# Patient Record
Sex: MALE | Race: OTHER | NOT HISPANIC OR LATINO | URBAN - METROPOLITAN AREA
[De-identification: names, ages, dates, MRNs, and addresses within clinical notes are randomized per-mention and may not be internally consistent; named-entity substitution may affect disease eponyms.]

---

## 2021-02-11 ENCOUNTER — INPATIENT (INPATIENT)
Facility: HOSPITAL | Age: 51
LOS: 4 days | Discharge: HOME | End: 2021-02-16
Attending: STUDENT IN AN ORGANIZED HEALTH CARE EDUCATION/TRAINING PROGRAM | Admitting: STUDENT IN AN ORGANIZED HEALTH CARE EDUCATION/TRAINING PROGRAM
Payer: MEDICAID

## 2021-02-11 VITALS
TEMPERATURE: 98 F | SYSTOLIC BLOOD PRESSURE: 136 MMHG | OXYGEN SATURATION: 100 % | DIASTOLIC BLOOD PRESSURE: 89 MMHG | HEART RATE: 108 BPM | RESPIRATION RATE: 18 BRPM

## 2021-02-11 DIAGNOSIS — E11.10 TYPE 2 DIABETES MELLITUS WITH KETOACIDOSIS WITHOUT COMA: ICD-10-CM

## 2021-02-11 DIAGNOSIS — Z98.890 OTHER SPECIFIED POSTPROCEDURAL STATES: Chronic | ICD-10-CM

## 2021-02-11 LAB
ALBUMIN SERPL ELPH-MCNC: 4.9 G/DL — SIGNIFICANT CHANGE UP (ref 3.3–5)
ALP SERPL-CCNC: 103 U/L — SIGNIFICANT CHANGE UP (ref 40–120)
ALT FLD-CCNC: 64 U/L — HIGH (ref 4–41)
ANION GAP SERPL CALC-SCNC: 37 MMOL/L — HIGH (ref 7–14)
AST SERPL-CCNC: 32 U/L — SIGNIFICANT CHANGE UP (ref 4–40)
B-OH-BUTYR SERPL-SCNC: 10.1 MMOL/L — HIGH (ref 0–0.4)
BASOPHILS # BLD AUTO: 0.03 K/UL — SIGNIFICANT CHANGE UP (ref 0–0.2)
BASOPHILS NFR BLD AUTO: 0.2 % — SIGNIFICANT CHANGE UP (ref 0–2)
BILIRUB SERPL-MCNC: 0.5 MG/DL — SIGNIFICANT CHANGE UP (ref 0.2–1.2)
BLOOD GAS VENOUS COMPREHENSIVE RESULT: SIGNIFICANT CHANGE UP
BLOOD GAS VENOUS COMPREHENSIVE RESULT: SIGNIFICANT CHANGE UP
BUN SERPL-MCNC: 30 MG/DL — HIGH (ref 7–23)
CALCIUM SERPL-MCNC: 10.4 MG/DL — SIGNIFICANT CHANGE UP (ref 8.4–10.5)
CHLORIDE SERPL-SCNC: 87 MMOL/L — LOW (ref 98–107)
CO2 SERPL-SCNC: 7 MMOL/L — CRITICAL LOW (ref 22–31)
CREAT SERPL-MCNC: 1.53 MG/DL — HIGH (ref 0.5–1.3)
EOSINOPHIL # BLD AUTO: 0 K/UL — SIGNIFICANT CHANGE UP (ref 0–0.5)
EOSINOPHIL NFR BLD AUTO: 0 % — SIGNIFICANT CHANGE UP (ref 0–6)
GLUCOSE SERPL-MCNC: 695 MG/DL — CRITICAL HIGH (ref 70–99)
HCT VFR BLD CALC: 51 % — HIGH (ref 39–50)
HGB BLD-MCNC: 16.4 G/DL — SIGNIFICANT CHANGE UP (ref 13–17)
IANC: 15.25 K/UL — HIGH (ref 1.5–8.5)
IMM GRANULOCYTES NFR BLD AUTO: 0.9 % — SIGNIFICANT CHANGE UP (ref 0–1.5)
LIDOCAIN IGE QN: 53 U/L — SIGNIFICANT CHANGE UP (ref 7–60)
LYMPHOCYTES # BLD AUTO: 0.58 K/UL — LOW (ref 1–3.3)
LYMPHOCYTES # BLD AUTO: 3.4 % — LOW (ref 13–44)
MAGNESIUM SERPL-MCNC: 2.5 MG/DL — SIGNIFICANT CHANGE UP (ref 1.6–2.6)
MCHC RBC-ENTMCNC: 27.6 PG — SIGNIFICANT CHANGE UP (ref 27–34)
MCHC RBC-ENTMCNC: 32.2 GM/DL — SIGNIFICANT CHANGE UP (ref 32–36)
MCV RBC AUTO: 85.9 FL — SIGNIFICANT CHANGE UP (ref 80–100)
MONOCYTES # BLD AUTO: 1.17 K/UL — HIGH (ref 0–0.9)
MONOCYTES NFR BLD AUTO: 6.8 % — SIGNIFICANT CHANGE UP (ref 2–14)
NEUTROPHILS # BLD AUTO: 15.25 K/UL — HIGH (ref 1.8–7.4)
NEUTROPHILS NFR BLD AUTO: 88.7 % — HIGH (ref 43–77)
NRBC # BLD: 0 /100 WBCS — SIGNIFICANT CHANGE UP
NRBC # FLD: 0 K/UL — SIGNIFICANT CHANGE UP
OSMOLALITY SERPL: 347 MOSM/KG — HIGH (ref 275–295)
PHOSPHATE SERPL-MCNC: 6.4 MG/DL — HIGH (ref 2.5–4.5)
PLATELET # BLD AUTO: 189 K/UL — SIGNIFICANT CHANGE UP (ref 150–400)
POTASSIUM SERPL-MCNC: 4.7 MMOL/L — SIGNIFICANT CHANGE UP (ref 3.5–5.3)
POTASSIUM SERPL-SCNC: 4.7 MMOL/L — SIGNIFICANT CHANGE UP (ref 3.5–5.3)
PROT SERPL-MCNC: 8 G/DL — SIGNIFICANT CHANGE UP (ref 6–8.3)
RBC # BLD: 5.94 M/UL — HIGH (ref 4.2–5.8)
RBC # FLD: 13 % — SIGNIFICANT CHANGE UP (ref 10.3–14.5)
SARS-COV-2 RNA SPEC QL NAA+PROBE: SIGNIFICANT CHANGE UP
SODIUM SERPL-SCNC: 131 MMOL/L — LOW (ref 135–145)
WBC # BLD: 17.18 K/UL — HIGH (ref 3.8–10.5)
WBC # FLD AUTO: 17.18 K/UL — HIGH (ref 3.8–10.5)

## 2021-02-11 PROCEDURE — 74177 CT ABD & PELVIS W/CONTRAST: CPT | Mod: 26

## 2021-02-11 PROCEDURE — 99285 EMERGENCY DEPT VISIT HI MDM: CPT

## 2021-02-11 PROCEDURE — 71045 X-RAY EXAM CHEST 1 VIEW: CPT | Mod: 26

## 2021-02-11 PROCEDURE — 99291 CRITICAL CARE FIRST HOUR: CPT

## 2021-02-11 RX ORDER — SODIUM CHLORIDE 9 MG/ML
1000 INJECTION, SOLUTION INTRAVENOUS
Refills: 0 | Status: DISCONTINUED | OUTPATIENT
Start: 2021-02-11 | End: 2021-02-12

## 2021-02-11 RX ORDER — SENNA PLUS 8.6 MG/1
0 TABLET ORAL
Qty: 0 | Refills: 0 | DISCHARGE

## 2021-02-11 RX ORDER — CALCIUM CARBONATE 500(1250)
1 TABLET ORAL
Qty: 0 | Refills: 0 | DISCHARGE

## 2021-02-11 RX ORDER — SODIUM CHLORIDE 9 MG/ML
1000 INJECTION, SOLUTION INTRAVENOUS ONCE
Refills: 0 | Status: COMPLETED | OUTPATIENT
Start: 2021-02-11 | End: 2021-02-11

## 2021-02-11 RX ORDER — ONDANSETRON 8 MG/1
4 TABLET, FILM COATED ORAL ONCE
Refills: 0 | Status: COMPLETED | OUTPATIENT
Start: 2021-02-11 | End: 2021-02-11

## 2021-02-11 RX ORDER — SODIUM CHLORIDE 9 MG/ML
1000 INJECTION INTRAMUSCULAR; INTRAVENOUS; SUBCUTANEOUS ONCE
Refills: 0 | Status: COMPLETED | OUTPATIENT
Start: 2021-02-11 | End: 2021-02-11

## 2021-02-11 RX ORDER — MORPHINE SULFATE 50 MG/1
4 CAPSULE, EXTENDED RELEASE ORAL ONCE
Refills: 0 | Status: DISCONTINUED | OUTPATIENT
Start: 2021-02-11 | End: 2021-02-11

## 2021-02-11 RX ORDER — INFLUENZA VIRUS VACCINE 15; 15; 15; 15 UG/.5ML; UG/.5ML; UG/.5ML; UG/.5ML
0.5 SUSPENSION INTRAMUSCULAR ONCE
Refills: 0 | Status: DISCONTINUED | OUTPATIENT
Start: 2021-02-11 | End: 2021-02-16

## 2021-02-11 RX ORDER — CHLORHEXIDINE GLUCONATE 213 G/1000ML
1 SOLUTION TOPICAL
Refills: 0 | Status: DISCONTINUED | OUTPATIENT
Start: 2021-02-11 | End: 2021-02-13

## 2021-02-11 RX ORDER — SODIUM CHLORIDE 9 MG/ML
1000 INJECTION INTRAMUSCULAR; INTRAVENOUS; SUBCUTANEOUS
Refills: 0 | Status: DISCONTINUED | OUTPATIENT
Start: 2021-02-11 | End: 2021-02-11

## 2021-02-11 RX ORDER — SENNA PLUS 8.6 MG/1
2 TABLET ORAL AT BEDTIME
Refills: 0 | Status: DISCONTINUED | OUTPATIENT
Start: 2021-02-11 | End: 2021-02-16

## 2021-02-11 RX ORDER — CALCIUM CARBONATE 500(1250)
1 TABLET ORAL THREE TIMES A DAY
Refills: 0 | Status: DISCONTINUED | OUTPATIENT
Start: 2021-02-11 | End: 2021-02-16

## 2021-02-11 RX ORDER — INSULIN HUMAN 100 [IU]/ML
6.4 INJECTION, SOLUTION SUBCUTANEOUS
Qty: 100 | Refills: 0 | Status: DISCONTINUED | OUTPATIENT
Start: 2021-02-11 | End: 2021-02-13

## 2021-02-11 RX ADMIN — INSULIN HUMAN 6.4 UNIT(S)/HR: 100 INJECTION, SOLUTION SUBCUTANEOUS at 18:09

## 2021-02-11 RX ADMIN — ONDANSETRON 4 MILLIGRAM(S): 8 TABLET, FILM COATED ORAL at 15:35

## 2021-02-11 RX ADMIN — SODIUM CHLORIDE 100 MILLILITER(S): 9 INJECTION, SOLUTION INTRAVENOUS at 22:56

## 2021-02-11 RX ADMIN — SODIUM CHLORIDE 1000 MILLILITER(S): 9 INJECTION INTRAMUSCULAR; INTRAVENOUS; SUBCUTANEOUS at 15:35

## 2021-02-11 RX ADMIN — MORPHINE SULFATE 4 MILLIGRAM(S): 50 CAPSULE, EXTENDED RELEASE ORAL at 15:35

## 2021-02-11 RX ADMIN — SODIUM CHLORIDE 1000 MILLILITER(S): 9 INJECTION INTRAMUSCULAR; INTRAVENOUS; SUBCUTANEOUS at 18:09

## 2021-02-11 NOTE — ED ADULT NURSE NOTE - NSIMPLEMENTINTERV_GEN_ALL_ED
Implemented All Universal Safety Interventions:  [place] to call system. Call bell, personal items and telephone within reach. Instruct patient to call for assistance. Room bathroom lighting operational. Non-slip footwear when patient is off stretcher. Physically safe environment: no spills, clutter or unnecessary equipment. Stretcher in lowest position, wheels locked, appropriate side rails in place.
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
## 2021-02-11 NOTE — H&P ADULT - NSHPSOCIALHISTORY_GEN_ALL_CORE
Denies ever smoking cigarettes. Drinks 2 packs of beer over the weekends. Denies other illicit/recreational drug use.

## 2021-02-11 NOTE — H&P ADULT - NSHPLABSRESULTS_GEN_ALL_CORE
LABS:                        16.4   17.18 )-----------( 189      ( 11 Feb 2021 17:13 )             51.0     02-11    131<L>  |  87<L>  |  30<H>  ----------------------------<  695<HH>  4.7   |  7<LL>  |  1.53<H>    Ca    10.4      11 Feb 2021 16:04  Phos  6.4     02-11  Mg     2.5     02-11    TPro  8.0  /  Alb  4.9  /  TBili  0.5  /  DBili  x   /  AST  32  /  ALT  64<H>  /  AlkPhos  103  02-11      RADIOLOGY & ADDITIONAL TESTS:  < from: Xray Chest 1 View- PORTABLE-Urgent (02.11.21 @ 15:17) >    IMPRESSION:  Clear lungs. No pleural effusions pneumothorax or pneumomediastinum.  Cardiac and mediastinal silhouettes within normal limits.  Trachea midline.  Unremarkable osseous structures.  < end of copied text > LABS:                        16.4   17.18 )-----------( 189      ( 11 Feb 2021 17:13 )             51.0     02-11    131<L>  |  87<L>  |  30<H>  ----------------------------<  695<HH>  4.7   |  7<LL>  |  1.53<H>    Ca    10.4      11 Feb 2021 16:04  Phos  6.4     02-11  Mg     2.5     02-11    TPro  8.0  /  Alb  4.9  /  TBili  0.5  /  DBili  x   /  AST  32  /  ALT  64<H>  /  AlkPhos  103  02-11      RADIOLOGY & ADDITIONAL TESTS:  < from: Xray Chest 1 View- PORTABLE-Urgent (02.11.21 @ 15:17) >  IMPRESSION:  Clear lungs. No pleural effusions pneumothorax or pneumomediastinum.  Cardiac and mediastinal silhouettes within normal limits.  Trachea midline.  Unremarkable osseous structures.  < end of copied text >    < from: CT Abdomen and Pelvis w/ IV Cont (02.11.21 @ 17:38) >  FINDINGS:  LOWER CHEST: Within normal limits.    LIVER: Steatosis.  BILE DUCTS: Normal caliber.  GALLBLADDER: Within normal limits.  SPLEEN: Within normal limits.  PANCREAS: A 6 mm hypodensity in the tail (2:32) likely represents a cyst versus IPMN.  ADRENALS: Within normal limits.  KIDNEYS/URETERS: Subcentimeter left renal hypodensity too small to characterize.    BLADDER: Within normal limits.  REPRODUCTIVE ORGANS: Prostate within normal limits.    BOWEL: No bowel obstruction. Appendix is normal.  PERITONEUM: No ascites.  VESSELS: Within normal limits.  RETROPERITONEUM/LYMPH NODES:No lymphadenopathy.  ABDOMINAL WALL: Within normal limits.  BONES: Within normal limits.    IMPRESSION:  No acute findings in the abdomen or pelvis.  Tiny pancreatic tail cyst. Suggest follow-up MRI and GI consultation.  < end of copied text >

## 2021-02-11 NOTE — ED ADULT NURSE NOTE - OBJECTIVE STATEMENT
Patient present with c/o of midabdominal pain and vomiting x 3 days. Denies fevers, chills, SOB, cough. Currently in NAD. IV placed RT arm #20g. Brought to RW after labwork to await CT. RW RN aware.
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
## 2021-02-11 NOTE — ED ADULT NURSE REASSESSMENT NOTE - NS ED NURSE REASSESS COMMENT FT1
Facilitator break coverage- Pt brought from RW for ICU consult to 1a. Additional 20G IV placed to left AC, NS bolus started as well as insulin drip at 6.4units/hr. ICU consult in progress. Sinus tach on cardiac monitor. Covering RN Timo aware of pt. Will continue to monitor.

## 2021-02-11 NOTE — ED ADULT NURSE REASSESSMENT NOTE - NS ED NURSE REASSESS COMMENT FT1
Pt remains in DKA, fs remains elevated, pt a&ox3, endorses weakness, denies any present n/v/d, afebrile, breathing even and unlabored, skin is cool dry and intact, ivl clear and patent, will continue to monitor.

## 2021-02-11 NOTE — H&P ADULT - ATTENDING COMMENTS
51 yo with FHx of DM, presenting with abd pain, n, v, x3d, found to have glucose 600's with AG 30's and bicarb 7, vbg pH 7.0.  CT abd basically negative for any acute pathology.  Given 2L ivf and started on insulin gtt in ED  Pt seen and examined with MICU team  He is awake and alert, somewhat groggy but oriented x3, c/o thirst.  BP stable mildly tachycardic  105, afeb  RA  Abd soft and NT  Lungs clear  Cor tachy reg  no edema  WBC 17  New DKA; likely undiagnosed DM; no evident precipitating event/infection; leukocytosis but afeb  - admit to MICU   - insulin gtt, aggressive ivf hydration, serial BMPs, fs q1h, f/u repeat vbg pH  - monitor off abx; f/u covid pcr, urine  - will need endocrine input and outpt setup  guarded  cc time 40 min
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
## 2021-02-11 NOTE — H&P ADULT - NSHPREVIEWOFSYSTEMS_GEN_ALL_CORE
REVIEW OF SYSTEMS:    CONSTITUTIONAL: +lightheadedness; No fevers or chills  EYES/ENT: +blurry vision prior to arrival (resolved); +dry mouth/tongue  RESPIRATORY: No cough, wheezing, hemoptysis; No shortness of breath  CARDIOVASCULAR: No chest pain or palpitations  GASTROINTESTINAL: +previously had mainly right lower abdominal pain, now with any pain; +n/v; +dots/streaks of blood in stool; No hematemesis; No melena.  GENITOURINARY: +urinary frequency; No dysuria or hematuria  NEUROLOGICAL: No numbness  SKIN: No itching, burning, rashes, or lesions   All other review of systems is negative unless indicated above.

## 2021-02-11 NOTE — H&P ADULT - NSHPPHYSICALEXAM_GEN_ALL_CORE
Vital Signs Last 24 Hrs  T(C): 36.6 (11 Feb 2021 15:39), Max: 36.6 (11 Feb 2021 14:44)  T(F): 97.8 (11 Feb 2021 15:39), Max: 97.9 (11 Feb 2021 14:44)  HR: 100 (11 Feb 2021 15:39) (100 - 108)  BP: 115/70 (11 Feb 2021 15:39) (115/70 - 136/89)  BP(mean): --  RR: 17 (11 Feb 2021 15:39) (17 - 18)  SpO2: 100% (11 Feb 2021 15:39) (100% - 100%)    GENERAL: NAD, well-developed, laying in bed appearing comfortable  HEENT: PERRL, EOMI, sclera clear, dry MM  CHEST/LUNG: Clear to auscultation bilaterally; No wheezes or rales  HEART: Regular rate and rhythm; No murmurs, rubs, or gallops  ABDOMEN: Soft, Nontender, Nondistended; Bowel sounds present  EXTREMITIES:  2+ Peripheral Pulses, No clubbing, cyanosis, or edema  PSYCH: AAOx3 (oriented to self, place, and date)  NEUROLOGY: non-focal  SKIN: No rashes or lesions

## 2021-02-11 NOTE — ED PROVIDER NOTE - PROGRESS NOTE DETAILS
SAÚL TORRES:  Labs revealed Glucose 695, Anion gap of 37.  Given concern for possible DKA, insulin drip started based on pt's stated weight.  MICU consulted.  Pt signed out to Red attending and resident at this time.  Pt A&Ox3 w/o e/o distress.  CT was performed; results pending.

## 2021-02-11 NOTE — ED ADULT NURSE REASSESSMENT NOTE - NS ED NURSE REASSESS COMMENT FT1
Mobile Critical Care RN:. insulin gtt decrease by 2 units per hour per DKA insulin infusion protocol.

## 2021-02-12 DIAGNOSIS — E11.10 TYPE 2 DIABETES MELLITUS WITH KETOACIDOSIS WITHOUT COMA: ICD-10-CM

## 2021-02-12 DIAGNOSIS — I10 ESSENTIAL (PRIMARY) HYPERTENSION: ICD-10-CM

## 2021-02-12 DIAGNOSIS — E78.5 HYPERLIPIDEMIA, UNSPECIFIED: ICD-10-CM

## 2021-02-12 DIAGNOSIS — E11.65 TYPE 2 DIABETES MELLITUS WITH HYPERGLYCEMIA: ICD-10-CM

## 2021-02-12 LAB
ALBUMIN SERPL ELPH-MCNC: 3.8 G/DL — SIGNIFICANT CHANGE UP (ref 3.3–5)
ALP SERPL-CCNC: 71 U/L — SIGNIFICANT CHANGE UP (ref 40–120)
ALT FLD-CCNC: 41 U/L — SIGNIFICANT CHANGE UP (ref 4–41)
ANION GAP SERPL CALC-SCNC: 12 MMOL/L — SIGNIFICANT CHANGE UP (ref 7–14)
ANION GAP SERPL CALC-SCNC: 16 MMOL/L — HIGH (ref 7–14)
ANION GAP SERPL CALC-SCNC: 17 MMOL/L — HIGH (ref 7–14)
ANION GAP SERPL CALC-SCNC: 18 MMOL/L — HIGH (ref 7–14)
ANION GAP SERPL CALC-SCNC: 30 MMOL/L — HIGH (ref 7–14)
AST SERPL-CCNC: 19 U/L — SIGNIFICANT CHANGE UP (ref 4–40)
B-OH-BUTYR SERPL-SCNC: 4.7 MMOL/L — HIGH (ref 0–0.4)
BASE EXCESS BLDV CALC-SCNC: -11.8 MMOL/L — LOW (ref -3–2)
BASOPHILS # BLD AUTO: 0 K/UL — SIGNIFICANT CHANGE UP (ref 0–0.2)
BASOPHILS NFR BLD AUTO: 0 % — SIGNIFICANT CHANGE UP (ref 0–2)
BILIRUB SERPL-MCNC: 0.4 MG/DL — SIGNIFICANT CHANGE UP (ref 0.2–1.2)
BUN SERPL-MCNC: 20 MG/DL — SIGNIFICANT CHANGE UP (ref 7–23)
BUN SERPL-MCNC: 23 MG/DL — SIGNIFICANT CHANGE UP (ref 7–23)
BUN SERPL-MCNC: 24 MG/DL — HIGH (ref 7–23)
BUN SERPL-MCNC: 26 MG/DL — HIGH (ref 7–23)
BUN SERPL-MCNC: 34 MG/DL — HIGH (ref 7–23)
CALCIUM SERPL-MCNC: 9.1 MG/DL — SIGNIFICANT CHANGE UP (ref 8.4–10.5)
CALCIUM SERPL-MCNC: 9.1 MG/DL — SIGNIFICANT CHANGE UP (ref 8.4–10.5)
CALCIUM SERPL-MCNC: 9.3 MG/DL — SIGNIFICANT CHANGE UP (ref 8.4–10.5)
CALCIUM SERPL-MCNC: 9.3 MG/DL — SIGNIFICANT CHANGE UP (ref 8.4–10.5)
CALCIUM SERPL-MCNC: 9.5 MG/DL — SIGNIFICANT CHANGE UP (ref 8.4–10.5)
CHLORIDE SERPL-SCNC: 105 MMOL/L — SIGNIFICANT CHANGE UP (ref 98–107)
CHLORIDE SERPL-SCNC: 108 MMOL/L — HIGH (ref 98–107)
CHLORIDE SERPL-SCNC: 109 MMOL/L — HIGH (ref 98–107)
CHLORIDE SERPL-SCNC: 111 MMOL/L — HIGH (ref 98–107)
CHLORIDE SERPL-SCNC: 98 MMOL/L — SIGNIFICANT CHANGE UP (ref 98–107)
CO2 SERPL-SCNC: 13 MMOL/L — LOW (ref 22–31)
CO2 SERPL-SCNC: 14 MMOL/L — LOW (ref 22–31)
CO2 SERPL-SCNC: 17 MMOL/L — LOW (ref 22–31)
CO2 SERPL-SCNC: 21 MMOL/L — LOW (ref 22–31)
CO2 SERPL-SCNC: 7 MMOL/L — CRITICAL LOW (ref 22–31)
CREAT SERPL-MCNC: 0.95 MG/DL — SIGNIFICANT CHANGE UP (ref 0.5–1.3)
CREAT SERPL-MCNC: 0.97 MG/DL — SIGNIFICANT CHANGE UP (ref 0.5–1.3)
CREAT SERPL-MCNC: 1.01 MG/DL — SIGNIFICANT CHANGE UP (ref 0.5–1.3)
CREAT SERPL-MCNC: 1.15 MG/DL — SIGNIFICANT CHANGE UP (ref 0.5–1.3)
CREAT SERPL-MCNC: 1.44 MG/DL — HIGH (ref 0.5–1.3)
EOSINOPHIL # BLD AUTO: 0 K/UL — SIGNIFICANT CHANGE UP (ref 0–0.5)
EOSINOPHIL NFR BLD AUTO: 0 % — SIGNIFICANT CHANGE UP (ref 0–6)
GLUCOSE SERPL-MCNC: 218 MG/DL — HIGH (ref 70–99)
GLUCOSE SERPL-MCNC: 249 MG/DL — HIGH (ref 70–99)
GLUCOSE SERPL-MCNC: 300 MG/DL — HIGH (ref 70–99)
GLUCOSE SERPL-MCNC: 315 MG/DL — HIGH (ref 70–99)
GLUCOSE SERPL-MCNC: 510 MG/DL — CRITICAL HIGH (ref 70–99)
HCO3 BLDV-SCNC: 14 MMOL/L — LOW (ref 20–27)
HCT VFR BLD CALC: 42.5 % — SIGNIFICANT CHANGE UP (ref 39–50)
HGB BLD-MCNC: 13.8 G/DL — SIGNIFICANT CHANGE UP (ref 13–17)
IANC: 11.41 K/UL — HIGH (ref 1.5–8.5)
LYMPHOCYTES # BLD AUTO: 0.39 K/UL — LOW (ref 1–3.3)
LYMPHOCYTES # BLD AUTO: 2.7 % — LOW (ref 13–44)
MAGNESIUM SERPL-MCNC: 2.4 MG/DL — SIGNIFICANT CHANGE UP (ref 1.6–2.6)
MAGNESIUM SERPL-MCNC: 2.5 MG/DL — SIGNIFICANT CHANGE UP (ref 1.6–2.6)
MAGNESIUM SERPL-MCNC: 2.6 MG/DL — SIGNIFICANT CHANGE UP (ref 1.6–2.6)
MCHC RBC-ENTMCNC: 27.5 PG — SIGNIFICANT CHANGE UP (ref 27–34)
MCHC RBC-ENTMCNC: 32.5 GM/DL — SIGNIFICANT CHANGE UP (ref 32–36)
MCV RBC AUTO: 84.8 FL — SIGNIFICANT CHANGE UP (ref 80–100)
MONOCYTES # BLD AUTO: 0.76 K/UL — SIGNIFICANT CHANGE UP (ref 0–0.9)
MONOCYTES NFR BLD AUTO: 5.3 % — SIGNIFICANT CHANGE UP (ref 2–14)
NEUTROPHILS # BLD AUTO: 12.99 K/UL — HIGH (ref 1.8–7.4)
NEUTROPHILS NFR BLD AUTO: 74.1 % — SIGNIFICANT CHANGE UP (ref 43–77)
PCO2 BLDV: 41 MMHG — SIGNIFICANT CHANGE UP (ref 41–51)
PH BLDV: 7.18 — CRITICAL LOW (ref 7.32–7.43)
PHOSPHATE SERPL-MCNC: 0.8 MG/DL — CRITICAL LOW (ref 2.5–4.5)
PHOSPHATE SERPL-MCNC: 1.1 MG/DL — LOW (ref 2.5–4.5)
PHOSPHATE SERPL-MCNC: 1.3 MG/DL — LOW (ref 2.5–4.5)
PLATELET # BLD AUTO: 159 K/UL — SIGNIFICANT CHANGE UP (ref 150–400)
PO2 BLDV: 26 MMHG — LOW (ref 35–40)
POTASSIUM SERPL-MCNC: 3.4 MMOL/L — LOW (ref 3.5–5.3)
POTASSIUM SERPL-MCNC: 3.5 MMOL/L — SIGNIFICANT CHANGE UP (ref 3.5–5.3)
POTASSIUM SERPL-MCNC: 3.6 MMOL/L — SIGNIFICANT CHANGE UP (ref 3.5–5.3)
POTASSIUM SERPL-MCNC: 3.9 MMOL/L — SIGNIFICANT CHANGE UP (ref 3.5–5.3)
POTASSIUM SERPL-MCNC: 4 MMOL/L — SIGNIFICANT CHANGE UP (ref 3.5–5.3)
POTASSIUM SERPL-SCNC: 3.4 MMOL/L — LOW (ref 3.5–5.3)
POTASSIUM SERPL-SCNC: 3.5 MMOL/L — SIGNIFICANT CHANGE UP (ref 3.5–5.3)
POTASSIUM SERPL-SCNC: 3.6 MMOL/L — SIGNIFICANT CHANGE UP (ref 3.5–5.3)
POTASSIUM SERPL-SCNC: 3.9 MMOL/L — SIGNIFICANT CHANGE UP (ref 3.5–5.3)
POTASSIUM SERPL-SCNC: 4 MMOL/L — SIGNIFICANT CHANGE UP (ref 3.5–5.3)
PROT SERPL-MCNC: 6.5 G/DL — SIGNIFICANT CHANGE UP (ref 6–8.3)
RBC # BLD: 5.01 M/UL — SIGNIFICANT CHANGE UP (ref 4.2–5.8)
RBC # FLD: 13.1 % — SIGNIFICANT CHANGE UP (ref 10.3–14.5)
SAO2 % BLDV: 51.5 % — LOW (ref 60–85)
SODIUM SERPL-SCNC: 135 MMOL/L — SIGNIFICANT CHANGE UP (ref 135–145)
SODIUM SERPL-SCNC: 135 MMOL/L — SIGNIFICANT CHANGE UP (ref 135–145)
SODIUM SERPL-SCNC: 140 MMOL/L — SIGNIFICANT CHANGE UP (ref 135–145)
SODIUM SERPL-SCNC: 142 MMOL/L — SIGNIFICANT CHANGE UP (ref 135–145)
SODIUM SERPL-SCNC: 144 MMOL/L — SIGNIFICANT CHANGE UP (ref 135–145)
WBC # BLD: 14.26 K/UL — HIGH (ref 3.8–10.5)
WBC # FLD AUTO: 14.26 K/UL — HIGH (ref 3.8–10.5)

## 2021-02-12 PROCEDURE — 99233 SBSQ HOSP IP/OBS HIGH 50: CPT

## 2021-02-12 PROCEDURE — 99223 1ST HOSP IP/OBS HIGH 75: CPT | Mod: GC

## 2021-02-12 RX ORDER — DEXTROSE 50 % IN WATER 50 %
12.5 SYRINGE (ML) INTRAVENOUS ONCE
Refills: 0 | Status: DISCONTINUED | OUTPATIENT
Start: 2021-02-12 | End: 2021-02-16

## 2021-02-12 RX ORDER — SODIUM CHLORIDE 9 MG/ML
1000 INJECTION, SOLUTION INTRAVENOUS
Refills: 0 | Status: DISCONTINUED | OUTPATIENT
Start: 2021-02-12 | End: 2021-02-16

## 2021-02-12 RX ORDER — INSULIN LISPRO 100/ML
VIAL (ML) SUBCUTANEOUS AT BEDTIME
Refills: 0 | Status: DISCONTINUED | OUTPATIENT
Start: 2021-02-12 | End: 2021-02-16

## 2021-02-12 RX ORDER — SODIUM CHLORIDE 9 MG/ML
1000 INJECTION, SOLUTION INTRAVENOUS
Refills: 0 | Status: DISCONTINUED | OUTPATIENT
Start: 2021-02-12 | End: 2021-02-12

## 2021-02-12 RX ORDER — INSULIN LISPRO 100/ML
6 VIAL (ML) SUBCUTANEOUS
Refills: 0 | Status: DISCONTINUED | OUTPATIENT
Start: 2021-02-12 | End: 2021-02-14

## 2021-02-12 RX ORDER — INSULIN GLARGINE 100 [IU]/ML
18 INJECTION, SOLUTION SUBCUTANEOUS AT BEDTIME
Refills: 0 | Status: DISCONTINUED | OUTPATIENT
Start: 2021-02-12 | End: 2021-02-14

## 2021-02-12 RX ORDER — DEXTROSE 50 % IN WATER 50 %
15 SYRINGE (ML) INTRAVENOUS ONCE
Refills: 0 | Status: DISCONTINUED | OUTPATIENT
Start: 2021-02-12 | End: 2021-02-16

## 2021-02-12 RX ORDER — DEXTROSE 50 % IN WATER 50 %
25 SYRINGE (ML) INTRAVENOUS ONCE
Refills: 0 | Status: DISCONTINUED | OUTPATIENT
Start: 2021-02-12 | End: 2021-02-16

## 2021-02-12 RX ORDER — POTASSIUM PHOSPHATE, MONOBASIC POTASSIUM PHOSPHATE, DIBASIC 236; 224 MG/ML; MG/ML
30 INJECTION, SOLUTION INTRAVENOUS ONCE
Refills: 0 | Status: COMPLETED | OUTPATIENT
Start: 2021-02-12 | End: 2021-02-12

## 2021-02-12 RX ORDER — DEXTROSE MONOHYDRATE, SODIUM CHLORIDE, AND POTASSIUM CHLORIDE 50; .745; 4.5 G/1000ML; G/1000ML; G/1000ML
1000 INJECTION, SOLUTION INTRAVENOUS
Refills: 0 | Status: DISCONTINUED | OUTPATIENT
Start: 2021-02-12 | End: 2021-02-13

## 2021-02-12 RX ORDER — GLUCAGON INJECTION, SOLUTION 0.5 MG/.1ML
1 INJECTION, SOLUTION SUBCUTANEOUS ONCE
Refills: 0 | Status: DISCONTINUED | OUTPATIENT
Start: 2021-02-12 | End: 2021-02-16

## 2021-02-12 RX ORDER — INSULIN LISPRO 100/ML
VIAL (ML) SUBCUTANEOUS
Refills: 0 | Status: DISCONTINUED | OUTPATIENT
Start: 2021-02-12 | End: 2021-02-16

## 2021-02-12 RX ADMIN — Medication 2: at 23:15

## 2021-02-12 RX ADMIN — CHLORHEXIDINE GLUCONATE 1 APPLICATION(S): 213 SOLUTION TOPICAL at 08:36

## 2021-02-12 RX ADMIN — INSULIN HUMAN 6.4 UNIT(S)/HR: 100 INJECTION, SOLUTION SUBCUTANEOUS at 10:56

## 2021-02-12 RX ADMIN — SODIUM CHLORIDE 100 MILLILITER(S): 9 INJECTION, SOLUTION INTRAVENOUS at 10:56

## 2021-02-12 RX ADMIN — POTASSIUM PHOSPHATE, MONOBASIC POTASSIUM PHOSPHATE, DIBASIC 83.33 MILLIMOLE(S): 236; 224 INJECTION, SOLUTION INTRAVENOUS at 19:44

## 2021-02-12 RX ADMIN — Medication 85 MILLIMOLE(S): at 18:18

## 2021-02-12 RX ADMIN — DEXTROSE MONOHYDRATE, SODIUM CHLORIDE, AND POTASSIUM CHLORIDE 100 MILLILITER(S): 50; .745; 4.5 INJECTION, SOLUTION INTRAVENOUS at 20:46

## 2021-02-12 RX ADMIN — INSULIN HUMAN 6.4 UNIT(S)/HR: 100 INJECTION, SOLUTION SUBCUTANEOUS at 00:14

## 2021-02-12 RX ADMIN — INSULIN GLARGINE 18 UNIT(S): 100 INJECTION, SOLUTION SUBCUTANEOUS at 22:36

## 2021-02-12 RX ADMIN — SODIUM CHLORIDE 1000 MILLILITER(S): 9 INJECTION, SOLUTION INTRAVENOUS at 00:14
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
## 2021-02-12 NOTE — PROGRESS NOTE ADULT - SUBJECTIVE AND OBJECTIVE BOX
INTERVAL HPI/OVERNIGHT EVENTS: FS and AG downtrending overnight. Otherwise no events.    SUBJECTIVE: Patient seen and examined at bedside. Intubated, sedated, ROS cannot be obtained.       VITAL SIGNS:  ICU Vital Signs Last 24 Hrs  T(C): 36.8 (12 Feb 2021 04:00), Max: 36.9 (12 Feb 2021 00:00)  T(F): 98.2 (12 Feb 2021 04:00), Max: 98.5 (12 Feb 2021 00:00)  HR: 101 (12 Feb 2021 04:00) (96 - 109)  BP: 132/79 (12 Feb 2021 04:00) (115/70 - 146/83)  BP(mean): 90 (12 Feb 2021 03:00) (89 - 110)  ABP: --  ABP(mean): --  RR: 15 (12 Feb 2021 04:00) (15 - 20)  SpO2: 99% (12 Feb 2021 04:00) (99% - 100%)      Plateau pressure:   P/F ratio:     02-11 @ 07:01  -  02-12 @ 07:00  --------------------------------------------------------  IN: 1511.6 mL / OUT: 250 mL / NET: 1261.6 mL      CAPILLARY BLOOD GLUCOSE      POCT Blood Glucose.: 261 mg/dL (12 Feb 2021 07:27)    ECG:    PHYSICAL EXAM:    General:   HEENT:   Neck:   Respiratory:   Cardiovascular:   Abdomen:   Extremities:  Neurological:    MEDICATIONS:  MEDICATIONS  (STANDING):  chlorhexidine 4% Liquid 1 Application(s) Topical <User Schedule>  influenza   Vaccine 0.5 milliLiter(s) IntraMuscular once  insulin regular Infusion 6.4 Unit(s)/Hr (6.4 mL/Hr) IV Continuous <Continuous>  lactated ringers 1000 milliLiter(s) (100 mL/Hr) IV Continuous <Continuous>  senna 2 Tablet(s) Oral at bedtime  sodium phosphate IVPB 30 milliMole(s) IV Intermittent once    MEDICATIONS  (PRN):  calcium carbonate    500 mG (Tums) Chewable 1 Tablet(s) Chew three times a day PRN Heartburn      ALLERGIES:  Allergies    No Known Allergies    Intolerances        LABS:                        13.8   14.26 )-----------( 159      ( 12 Feb 2021 06:20 )             42.5     02-12    135  |  105  |  26<H>  ----------------------------<  315<H>  3.9   |  13<L>  |  1.15    Ca    9.3      12 Feb 2021 06:20  Phos  1.3     02-12  Mg     2.4     02-12    TPro  6.5  /  Alb  3.8  /  TBili  0.4  /  DBili  x   /  AST  19  /  ALT  41  /  AlkPhos  71  02-12          RADIOLOGY & ADDITIONAL TESTS: Reviewed.   INTERVAL HPI/OVERNIGHT EVENTS: FS and AG downtrending overnight. Otherwise no events.    SUBJECTIVE: Patient seen and examined at bedside. Intubated, sedated, ROS cannot be obtained.       VITAL SIGNS:  ICU Vital Signs Last 24 Hrs  T(C): 36.8 (12 Feb 2021 04:00), Max: 36.9 (12 Feb 2021 00:00)  T(F): 98.2 (12 Feb 2021 04:00), Max: 98.5 (12 Feb 2021 00:00)  HR: 101 (12 Feb 2021 04:00) (96 - 109)  BP: 132/79 (12 Feb 2021 04:00) (115/70 - 146/83)  BP(mean): 90 (12 Feb 2021 03:00) (89 - 110)  ABP: --  ABP(mean): --  RR: 15 (12 Feb 2021 04:00) (15 - 20)  SpO2: 99% (12 Feb 2021 04:00) (99% - 100%)      Plateau pressure:   P/F ratio:     02-11 @ 07:01  -  02-12 @ 07:00  --------------------------------------------------------  IN: 1511.6 mL / OUT: 250 mL / NET: 1261.6 mL      CAPILLARY BLOOD GLUCOSE      POCT Blood Glucose.: 261 mg/dL (12 Feb 2021 07:27)    ECG:    PHYSICAL EXAM:    GENERAL: NAD, well-groomed, well-developed  HEAD:  Atraumatic, Normocephalic  EYES: EOMI, PERRLA, conjunctiva and sclera clear  ENT: No tonsillar erythema, exudates, or enlargement; Moist mucous membranes, Good dentition, No lesions  NECK: Supple, No JVD, Normal thyroid  CHEST/LUNG: Clear to ausculation bilaterally; No rales, rhonchi, wheezing, or rubs  HEART: Regular rate and rhythm; No murmurs, rubs, or gallops  ABDOMEN: Soft, Nontender, Nondistended; Bowel sounds present  EXTREMITIES:  2+ Peripheral Pulses, No clubbing, cyanosis, or edema  LYMPH: No lymphadenopathy noted  SKIN: No rashes or lesions  NERVOUS SYSTEM:  Alert & Oriented X3; Motor Strength 5/5 B/L upper and lower extremities; DTRs 2+ intact and symmetric    MEDICATIONS:  MEDICATIONS  (STANDING):  chlorhexidine 4% Liquid 1 Application(s) Topical <User Schedule>  influenza   Vaccine 0.5 milliLiter(s) IntraMuscular once  insulin regular Infusion 6.4 Unit(s)/Hr (6.4 mL/Hr) IV Continuous <Continuous>  lactated ringers 1000 milliLiter(s) (100 mL/Hr) IV Continuous <Continuous>  senna 2 Tablet(s) Oral at bedtime  sodium phosphate IVPB 30 milliMole(s) IV Intermittent once    MEDICATIONS  (PRN):  calcium carbonate    500 mG (Tums) Chewable 1 Tablet(s) Chew three times a day PRN Heartburn      ALLERGIES:  Allergies    No Known Allergies    Intolerances        LABS:                        13.8   14.26 )-----------( 159      ( 12 Feb 2021 06:20 )             42.5     02-12    135  |  105  |  26<H>  ----------------------------<  315<H>  3.9   |  13<L>  |  1.15    Ca    9.3      12 Feb 2021 06:20  Phos  1.3     02-12  Mg     2.4     02-12    TPro  6.5  /  Alb  3.8  /  TBili  0.4  /  DBili  x   /  AST  19  /  ALT  41  /  AlkPhos  71  02-12          RADIOLOGY & ADDITIONAL TESTS: Reviewed.   INTERVAL HPI/OVERNIGHT EVENTS: FS and AG downtrending overnight. Otherwise no events.    VITAL SIGNS:  ICU Vital Signs Last 24 Hrs  T(C): 36.8 (12 Feb 2021 04:00), Max: 36.9 (12 Feb 2021 00:00)  T(F): 98.2 (12 Feb 2021 04:00), Max: 98.5 (12 Feb 2021 00:00)  HR: 101 (12 Feb 2021 04:00) (96 - 109)  BP: 132/79 (12 Feb 2021 04:00) (115/70 - 146/83)  BP(mean): 90 (12 Feb 2021 03:00) (89 - 110)  ABP: --  ABP(mean): --  RR: 15 (12 Feb 2021 04:00) (15 - 20)  SpO2: 99% (12 Feb 2021 04:00) (99% - 100%)      Plateau pressure:   P/F ratio:     02-11 @ 07:01  -  02-12 @ 07:00  --------------------------------------------------------  IN: 1511.6 mL / OUT: 250 mL / NET: 1261.6 mL      CAPILLARY BLOOD GLUCOSE      POCT Blood Glucose.: 261 mg/dL (12 Feb 2021 07:27)    ECG:    PHYSICAL EXAM:    GENERAL: NAD, well-groomed, well-developed  HEAD:  Atraumatic, Normocephalic  EYES: EOMI, PERRLA, conjunctiva and sclera clear  ENT: No tonsillar erythema, exudates, or enlargement; Moist mucous membranes, Good dentition, No lesions  NECK: Supple, No JVD, Normal thyroid  CHEST/LUNG: Clear to ausculation bilaterally; No rales, rhonchi, wheezing, or rubs  HEART: Regular rate and rhythm; No murmurs, rubs, or gallops  ABDOMEN: Soft, Nontender, Nondistended; Bowel sounds present  EXTREMITIES:  2+ Peripheral Pulses, No clubbing, cyanosis, or edema  LYMPH: No lymphadenopathy noted  SKIN: No rashes or lesions  NERVOUS SYSTEM:  Alert & Oriented X3; Motor Strength 5/5 B/L upper and lower extremities; DTRs 2+ intact and symmetric    MEDICATIONS:  MEDICATIONS  (STANDING):  chlorhexidine 4% Liquid 1 Application(s) Topical <User Schedule>  influenza   Vaccine 0.5 milliLiter(s) IntraMuscular once  insulin regular Infusion 6.4 Unit(s)/Hr (6.4 mL/Hr) IV Continuous <Continuous>  lactated ringers 1000 milliLiter(s) (100 mL/Hr) IV Continuous <Continuous>  senna 2 Tablet(s) Oral at bedtime  sodium phosphate IVPB 30 milliMole(s) IV Intermittent once    MEDICATIONS  (PRN):  calcium carbonate    500 mG (Tums) Chewable 1 Tablet(s) Chew three times a day PRN Heartburn      ALLERGIES:  Allergies    No Known Allergies    Intolerances        LABS:                        13.8   14.26 )-----------( 159      ( 12 Feb 2021 06:20 )             42.5     02-12    135  |  105  |  26<H>  ----------------------------<  315<H>  3.9   |  13<L>  |  1.15    Ca    9.3      12 Feb 2021 06:20  Phos  1.3     02-12  Mg     2.4     02-12    TPro  6.5  /  Alb  3.8  /  TBili  0.4  /  DBili  x   /  AST  19  /  ALT  41  /  AlkPhos  71  02-12          RADIOLOGY & ADDITIONAL TESTS: Reviewed.   INTERVAL HPI/OVERNIGHT EVENTS: FS and AG downtrending overnight. Otherwise no events.    VITAL SIGNS:  ICU Vital Signs Last 24 Hrs  T(C): 36.8 (12 Feb 2021 04:00), Max: 36.9 (12 Feb 2021 00:00)  T(F): 98.2 (12 Feb 2021 04:00), Max: 98.5 (12 Feb 2021 00:00)  HR: 101 (12 Feb 2021 04:00) (96 - 109)  BP: 132/79 (12 Feb 2021 04:00) (115/70 - 146/83)  BP(mean): 90 (12 Feb 2021 03:00) (89 - 110)  RR: 15 (12 Feb 2021 04:00) (15 - 20)  SpO2: 99% (12 Feb 2021 04:00) (99% - 100%)      Plateau pressure:   P/F ratio:     02-11 @ 07:01  -  02-12 @ 07:00  --------------------------------------------------------  IN: 1511.6 mL / OUT: 250 mL / NET: 1261.6 mL      CAPILLARY BLOOD GLUCOSE      POCT Blood Glucose.: 261 mg/dL (12 Feb 2021 07:27)    ECG:    PHYSICAL EXAM:    GENERAL: NAD, well-groomed, well-developed  HEAD:  Atraumatic, Normocephalic  EYES: EOMI, PERRLA, conjunctiva and sclera clear  ENT: No tonsillar erythema, exudates, or enlargement; Moist mucous membranes, Good dentition, No lesions  NECK: Supple, No JVD, Normal thyroid  CHEST/LUNG: Clear to ausculation bilaterally; No rales, rhonchi, wheezing, or rubs  HEART: Regular rate and rhythm; No murmurs, rubs, or gallops  ABDOMEN: Soft, Nontender, Nondistended; Bowel sounds present  EXTREMITIES:  2+ Peripheral Pulses, No clubbing, cyanosis, or edema  LYMPH: No lymphadenopathy noted  SKIN: No rashes or lesions  NERVOUS SYSTEM:  Alert & Oriented X3; Motor Strength 5/5 B/L upper and lower extremities; DTRs 2+ intact and symmetric    MEDICATIONS:  MEDICATIONS  (STANDING):  chlorhexidine 4% Liquid 1 Application(s) Topical <User Schedule>  influenza   Vaccine 0.5 milliLiter(s) IntraMuscular once  insulin regular Infusion 6.4 Unit(s)/Hr (6.4 mL/Hr) IV Continuous <Continuous>  lactated ringers 1000 milliLiter(s) (100 mL/Hr) IV Continuous <Continuous>  senna 2 Tablet(s) Oral at bedtime  sodium phosphate IVPB 30 milliMole(s) IV Intermittent once    MEDICATIONS  (PRN):  calcium carbonate    500 mG (Tums) Chewable 1 Tablet(s) Chew three times a day PRN Heartburn      ALLERGIES:  Allergies    No Known Allergies    Intolerances        LABS:                        13.8   14.26 )-----------( 159      ( 12 Feb 2021 06:20 )             42.5     02-12    135  |  105  |  26<H>  ----------------------------<  315<H>  3.9   |  13<L>  |  1.15    Ca    9.3      12 Feb 2021 06:20  Phos  1.3     02-12  Mg     2.4     02-12    TPro  6.5  /  Alb  3.8  /  TBili  0.4  /  DBili  x   /  AST  19  /  ALT  41  /  AlkPhos  71  02-12          RADIOLOGY & ADDITIONAL TESTS: Reviewed. INTERVAL HPI/OVERNIGHT EVENTS: FS and AG downtrending overnight. Otherwise no events.    ROS:  CONSTITUTIONAL: No fevers or chills  EYES/ENT: No changes in vision  RESPIRATORY: No cough, wheezing, hemoptysis; No shortness of breath  CARDIOVASCULAR: No chest pain or palpitations  GASTROINTESTINAL: No abdominal pain, no nausea/vomiting, no diarrhea  GENITOURINARY: No dysuria or hematuria  NEUROLOGICAL: No numbness  SKIN: No itching, burning, rashes, or lesions   All other review of systems is negative unless indicated above.    VITAL SIGNS:  ICU Vital Signs Last 24 Hrs  T(C): 36.8 (12 Feb 2021 04:00), Max: 36.9 (12 Feb 2021 00:00)  T(F): 98.2 (12 Feb 2021 04:00), Max: 98.5 (12 Feb 2021 00:00)  HR: 101 (12 Feb 2021 04:00) (96 - 109)  BP: 132/79 (12 Feb 2021 04:00) (115/70 - 146/83)  BP(mean): 90 (12 Feb 2021 03:00) (89 - 110)  RR: 15 (12 Feb 2021 04:00) (15 - 20)  SpO2: 99% (12 Feb 2021 04:00) (99% - 100%)      Plateau pressure:   P/F ratio:     02-11 @ 07:01  -  02-12 @ 07:00  --------------------------------------------------------  IN: 1511.6 mL / OUT: 250 mL / NET: 1261.6 mL      CAPILLARY BLOOD GLUCOSE      POCT Blood Glucose.: 261 mg/dL (12 Feb 2021 07:27)    ECG:    PHYSICAL EXAM:  GENERAL: NAD, well-developed, laying in bed appearing comfortable  HEENT: PERRL, EOMI, sclera clear, MMM  CHEST/LUNG: Clear to auscultation bilaterally; No wheezes or rales  HEART: Regular rate and rhythm; No murmurs, rubs, or gallops  ABDOMEN: Soft, Nontender, Nondistended; Bowel sounds present  EXTREMITIES:  2+ Peripheral Pulses, No clubbing, cyanosis, or edema  PSYCH: AAOx3 (oriented to self, place, and date)  NEUROLOGY: non-focal  SKIN: No rashes or lesions    MEDICATIONS:  MEDICATIONS  (STANDING):  chlorhexidine 4% Liquid 1 Application(s) Topical <User Schedule>  influenza   Vaccine 0.5 milliLiter(s) IntraMuscular once  insulin regular Infusion 6.4 Unit(s)/Hr (6.4 mL/Hr) IV Continuous <Continuous>  lactated ringers 1000 milliLiter(s) (100 mL/Hr) IV Continuous <Continuous>  senna 2 Tablet(s) Oral at bedtime  sodium phosphate IVPB 30 milliMole(s) IV Intermittent once    MEDICATIONS  (PRN):  calcium carbonate    500 mG (Tums) Chewable 1 Tablet(s) Chew three times a day PRN Heartburn      ALLERGIES:  Allergies    No Known Allergies    Intolerances        LABS:                        13.8   14.26 )-----------( 159      ( 12 Feb 2021 06:20 )             42.5     02-12    135  |  105  |  26<H>  ----------------------------<  315<H>  3.9   |  13<L>  |  1.15    Ca    9.3      12 Feb 2021 06:20  Phos  1.3     02-12  Mg     2.4     02-12    TPro  6.5  /  Alb  3.8  /  TBili  0.4  /  DBili  x   /  AST  19  /  ALT  41  /  AlkPhos  71  02-12          RADIOLOGY & ADDITIONAL TESTS: Reviewed.

## 2021-02-12 NOTE — CONSULT NOTE ADULT - ATTENDING COMMENTS
New onset DM presenting in DKA. Clarify type of DM, send antibodies.  [facility] now [facility].  Will need RD consult, insulin pen teaching, dc on basal and bolus insulin pens.  Outpatient endocrine follow up.    José Manuel Abad MD  Division of Endocrinology  Pager: [phone]    If after 6PM or before 9AM, or on weekends/holidays, please call endocrine answering service for assistance ([phone]).  For nonurgent matters email [email or web address] for assistance.
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
## 2021-02-13 DIAGNOSIS — Z29.9 ENCOUNTER FOR PROPHYLACTIC MEASURES, UNSPECIFIED: ICD-10-CM

## 2021-02-13 DIAGNOSIS — K86.2 CYST OF PANCREAS: ICD-10-CM

## 2021-02-13 LAB
ANION GAP SERPL CALC-SCNC: 8 MMOL/L — SIGNIFICANT CHANGE UP (ref 7–14)
BUN SERPL-MCNC: 16 MG/DL — SIGNIFICANT CHANGE UP (ref 7–23)
CALCIUM SERPL-MCNC: 8.8 MG/DL — SIGNIFICANT CHANGE UP (ref 8.4–10.5)
CHLORIDE SERPL-SCNC: 111 MMOL/L — HIGH (ref 98–107)
CO2 SERPL-SCNC: 22 MMOL/L — SIGNIFICANT CHANGE UP (ref 22–31)
CREAT SERPL-MCNC: 0.82 MG/DL — SIGNIFICANT CHANGE UP (ref 0.5–1.3)
GLUCOSE SERPL-MCNC: 252 MG/DL — HIGH (ref 70–99)
HCT VFR BLD CALC: 35.7 % — LOW (ref 39–50)
HGB BLD-MCNC: 12.1 G/DL — LOW (ref 13–17)
MAGNESIUM SERPL-MCNC: 2.3 MG/DL — SIGNIFICANT CHANGE UP (ref 1.6–2.6)
MCHC RBC-ENTMCNC: 27.9 PG — SIGNIFICANT CHANGE UP (ref 27–34)
MCHC RBC-ENTMCNC: 33.9 GM/DL — SIGNIFICANT CHANGE UP (ref 32–36)
MCV RBC AUTO: 82.3 FL — SIGNIFICANT CHANGE UP (ref 80–100)
NRBC # BLD: 0 /100 WBCS — SIGNIFICANT CHANGE UP
NRBC # FLD: 0 K/UL — SIGNIFICANT CHANGE UP
PLATELET # BLD AUTO: 130 K/UL — LOW (ref 150–400)
POTASSIUM SERPL-MCNC: 3.9 MMOL/L — SIGNIFICANT CHANGE UP (ref 3.5–5.3)
POTASSIUM SERPL-SCNC: 3.9 MMOL/L — SIGNIFICANT CHANGE UP (ref 3.5–5.3)
RBC # BLD: 4.34 M/UL — SIGNIFICANT CHANGE UP (ref 4.2–5.8)
RBC # FLD: 13.2 % — SIGNIFICANT CHANGE UP (ref 10.3–14.5)
SODIUM SERPL-SCNC: 141 MMOL/L — SIGNIFICANT CHANGE UP (ref 135–145)
WBC # BLD: 7.99 K/UL — SIGNIFICANT CHANGE UP (ref 3.8–10.5)
WBC # FLD AUTO: 7.99 K/UL — SIGNIFICANT CHANGE UP (ref 3.8–10.5)

## 2021-02-13 PROCEDURE — 99233 SBSQ HOSP IP/OBS HIGH 50: CPT | Mod: GC

## 2021-02-13 RX ORDER — ONDANSETRON 8 MG/1
4 TABLET, FILM COATED ORAL EVERY 6 HOURS
Refills: 0 | Status: DISCONTINUED | OUTPATIENT
Start: 2021-02-13 | End: 2021-02-16

## 2021-02-13 RX ORDER — FAMOTIDINE 10 MG/ML
20 INJECTION INTRAVENOUS DAILY
Refills: 0 | Status: DISCONTINUED | OUTPATIENT
Start: 2021-02-13 | End: 2021-02-16

## 2021-02-13 RX ORDER — POTASSIUM PHOSPHATE, MONOBASIC POTASSIUM PHOSPHATE, DIBASIC 236; 224 MG/ML; MG/ML
15 INJECTION, SOLUTION INTRAVENOUS ONCE
Refills: 0 | Status: COMPLETED | OUTPATIENT
Start: 2021-02-13 | End: 2021-02-13

## 2021-02-13 RX ADMIN — Medication 6 UNIT(S): at 08:34

## 2021-02-13 RX ADMIN — Medication 6 UNIT(S): at 17:53

## 2021-02-13 RX ADMIN — POTASSIUM PHOSPHATE, MONOBASIC POTASSIUM PHOSPHATE, DIBASIC 62.5 MILLIMOLE(S): 236; 224 INJECTION, SOLUTION INTRAVENOUS at 12:53

## 2021-02-13 RX ADMIN — SENNA PLUS 2 TABLET(S): 8.6 TABLET ORAL at 21:56

## 2021-02-13 RX ADMIN — Medication 2: at 08:34

## 2021-02-13 RX ADMIN — Medication 6 UNIT(S): at 12:39

## 2021-02-13 RX ADMIN — FAMOTIDINE 20 MILLIGRAM(S): 10 INJECTION INTRAVENOUS at 12:53

## 2021-02-13 RX ADMIN — Medication 1: at 17:53

## 2021-02-13 RX ADMIN — INSULIN GLARGINE 18 UNIT(S): 100 INJECTION, SOLUTION SUBCUTANEOUS at 21:55

## 2021-02-13 RX ADMIN — Medication 3: at 12:39

## 2021-02-13 NOTE — PROGRESS NOTE ADULT - PROBLEM SELECTOR PLAN 2
A1c 12.7  --Lantus qhs 18 U, admelog TID with measl 6 U A1c 12.7  --Lantus qhs 18 Units and Lispro 6 units premeal  --f/u FS qac qhs  --f/u Endo recs

## 2021-02-13 NOTE — PROGRESS NOTE ADULT - PROBLEM SELECTOR PLAN 1
At presentation, pt with blood sugar of 695, BHB 10.1, pH 7.0, bicarb 7, HbA1c 12.7 AG now closed and FSBS in 200s  - Endocrine rec- lantus qhs 18 U, admelog TID with measl 6 U, and LOW ISS with meals and qhs  --f/u endo recs At presentation, pt with blood sugar of 695, BHB 10.1, pH 7.0, bicarb 7, HbA1c 12.7 AG now closed and FSBS in 200s  --Lantus qhs 18 Units and Lispro 6 units premeal  --f/u endo recs
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
## 2021-02-13 NOTE — PROGRESS NOTE ADULT - SUBJECTIVE AND OBJECTIVE BOX
Ariel Carpio MD  PGY 1 Department of Internal Medicine  Pager: [phone] ([facility])   Pager: [phone] ([facility])  Also available on Microsoft Teams      Patient is a 50y old  Male who presents with a chief complaint of abdominal pain, n/v (12 Feb 2021 13:53)      SUBJECTIVE / OVERNIGHT EVENTS: No acute overnight events. Pt seen and examined. States he has nausea/reflux. Denies fevers, chills, CP, SOB, Abdominal pain, vomiting Constipation, Diarrhea    MEDICATIONS  (STANDING):  dextrose 40% Gel 15 Gram(s) Oral once  dextrose 5%. 1000 milliLiter(s) (50 mL/Hr) IV Continuous <Continuous>  dextrose 5%. 1000 milliLiter(s) (100 mL/Hr) IV Continuous <Continuous>  dextrose 50% Injectable 25 Gram(s) IV Push once  dextrose 50% Injectable 12.5 Gram(s) IV Push once  dextrose 50% Injectable 25 Gram(s) IV Push once  famotidine    Tablet 20 milliGRAM(s) Oral daily  glucagon  Injectable 1 milliGRAM(s) IntraMuscular once  influenza   Vaccine 0.5 milliLiter(s) IntraMuscular once  insulin glargine Injectable (LANTUS) 18 Unit(s) SubCutaneous at bedtime  insulin lispro (ADMELOG) corrective regimen sliding scale   SubCutaneous three times a day before meals  insulin lispro (ADMELOG) corrective regimen sliding scale   SubCutaneous at bedtime  insulin lispro Injectable (ADMELOG) 6 Unit(s) SubCutaneous three times a day before meals  senna 2 Tablet(s) Oral at bedtime    MEDICATIONS  (PRN):  calcium carbonate    500 mG (Tums) Chewable 1 Tablet(s) Chew three times a day PRN Heartburn  ondansetron Injectable 4 milliGRAM(s) IV Push every 6 hours PRN Nausea and/or Vomiting      I&O's Summary    12 Feb 2021 07:01  -  13 Feb 2021 07:00  --------------------------------------------------------  IN: 2544.1 mL / OUT: 1350 mL / NET: 1194.1 mL        Vital Signs Last 24 Hrs  T(C): 36.2 (13 Feb 2021 04:20), Max: 36.7 (13 Feb 2021 00:00)  T(F): 97.2 (13 Feb 2021 04:20), Max: 98.1 (13 Feb 2021 00:00)  HR: 65 (13 Feb 2021 04:20) (61 - 90)  BP: 136/84 (13 Feb 2021 04:20) (120/79 - 142/84)  BP(mean): 91 (13 Feb 2021 02:00) (80 - 98)  RR: 16 (13 Feb 2021 04:20) (10 - 16)  SpO2: 98% (13 Feb 2021 04:20) (95% - 100%)    =================PHYSICAL EXAM=================    GENERAL: Laying comfortably, NAD  EYES: EOMI, PERRL, no scleral icterus  NECK: No JVD  LUNG: Clear to auscultation bilaterally; No wheeze, crackles or rhonci  HEART: Regular rate and rhythm; No murmurs, rubs, or gallops  ABDOMEN: Soft, Nontender, Nondistended  EXTREMITIES:  No LE edema, 2+ Peripheral Pulses, No clubbing, cyanosis, or edema  PSYCH: AAOx3  NEUROLOGY: non-focal, strength 5/5 in all extremities, sensation intact  SKIN: No rashes or lesions    =================================================    LABS:                        12.1   7.99  )-----------( 130      ( 13 Feb 2021 01:37 )             35.7     Auto Eosinophil # x     / Auto Eosinophil % x     / Auto Neutrophil # x     / Auto Neutrophil % x     / BANDS % x                            13.8   14.26 )-----------( 159      ( 12 Feb 2021 06:20 )             42.5     Auto Eosinophil # 0.00  / Auto Eosinophil % 0.0   / Auto Neutrophil # 12.99 / Auto Neutrophil % 74.1  / BANDS % 17.0                         16.4   17.18 )-----------( 189      ( 11 Feb 2021 17:13 )             51.0     Auto Eosinophil # 0.00  / Auto Eosinophil % 0.0   / Auto Neutrophil # 15.25 / Auto Neutrophil % 88.7  / BANDS % x        02-13    141  |  111<H>  |  16  ----------------------------<  252<H>  3.9   |  22  |  0.82  02-12    144  |  111<H>  |  20  ----------------------------<  218<H>  3.4<L>   |  21<L>  |  0.95  02-12    142  |  109<H>  |  23  ----------------------------<  249<H>  4.0   |  17<L>  |  0.97    Ca    8.8      13 Feb 2021 01:37  Mg     2.3     02-13  Phos  1.1     02-12  TPro  6.5  /  Alb  3.8  /  TBili  0.4  /  DBili  x   /  AST  19  /  ALT  41  /  AlkPhos  71  02-12  TPro  8.0  /  Alb  4.9  /  TBili  0.5  /  DBili  x   /  AST  32  /  ALT  64<H>  /  AlkPhos  103  02-11                  RADIOLOGY & ADDITIONAL TESTS:    Imaging Personally Reviewed:    Consultant(s) Notes Reviewed:      Care Discussed with Consultants/Other Providers:
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
## 2021-02-14 ENCOUNTER — TRANSCRIPTION ENCOUNTER (OUTPATIENT)
Age: 51
End: 2021-02-14

## 2021-02-14 DIAGNOSIS — E87.6 HYPOKALEMIA: ICD-10-CM

## 2021-02-14 LAB
ALBUMIN SERPL ELPH-MCNC: 3.7 G/DL — SIGNIFICANT CHANGE UP (ref 3.3–5)
ALP SERPL-CCNC: 71 U/L — SIGNIFICANT CHANGE UP (ref 40–120)
ALT FLD-CCNC: 43 U/L — HIGH (ref 4–41)
ANION GAP SERPL CALC-SCNC: 13 MMOL/L — SIGNIFICANT CHANGE UP (ref 7–14)
ANION GAP SERPL CALC-SCNC: 17 MMOL/L — HIGH (ref 7–14)
AST SERPL-CCNC: 53 U/L — HIGH (ref 4–40)
BASOPHILS # BLD AUTO: 0.02 K/UL — SIGNIFICANT CHANGE UP (ref 0–0.2)
BASOPHILS NFR BLD AUTO: 0.3 % — SIGNIFICANT CHANGE UP (ref 0–2)
BILIRUB SERPL-MCNC: 0.8 MG/DL — SIGNIFICANT CHANGE UP (ref 0.2–1.2)
BLOOD GAS VENOUS COMPREHENSIVE RESULT: SIGNIFICANT CHANGE UP
BUN SERPL-MCNC: 11 MG/DL — SIGNIFICANT CHANGE UP (ref 7–23)
BUN SERPL-MCNC: 9 MG/DL — SIGNIFICANT CHANGE UP (ref 7–23)
CALCIUM SERPL-MCNC: 9.1 MG/DL — SIGNIFICANT CHANGE UP (ref 8.4–10.5)
CALCIUM SERPL-MCNC: 9.1 MG/DL — SIGNIFICANT CHANGE UP (ref 8.4–10.5)
CHLORIDE SERPL-SCNC: 102 MMOL/L — SIGNIFICANT CHANGE UP (ref 98–107)
CHLORIDE SERPL-SCNC: 99 MMOL/L — SIGNIFICANT CHANGE UP (ref 98–107)
CO2 SERPL-SCNC: 21 MMOL/L — LOW (ref 22–31)
CO2 SERPL-SCNC: 25 MMOL/L — SIGNIFICANT CHANGE UP (ref 22–31)
CREAT SERPL-MCNC: 0.79 MG/DL — SIGNIFICANT CHANGE UP (ref 0.5–1.3)
CREAT SERPL-MCNC: 0.81 MG/DL — SIGNIFICANT CHANGE UP (ref 0.5–1.3)
EOSINOPHIL # BLD AUTO: 0.02 K/UL — SIGNIFICANT CHANGE UP (ref 0–0.5)
EOSINOPHIL NFR BLD AUTO: 0.3 % — SIGNIFICANT CHANGE UP (ref 0–6)
GLUCOSE SERPL-MCNC: 166 MG/DL — HIGH (ref 70–99)
GLUCOSE SERPL-MCNC: 201 MG/DL — HIGH (ref 70–99)
HCT VFR BLD CALC: 40.7 % — SIGNIFICANT CHANGE UP (ref 39–50)
HGB BLD-MCNC: 14.1 G/DL — SIGNIFICANT CHANGE UP (ref 13–17)
IANC: 3.78 K/UL — SIGNIFICANT CHANGE UP (ref 1.5–8.5)
IMM GRANULOCYTES NFR BLD AUTO: 0.3 % — SIGNIFICANT CHANGE UP (ref 0–1.5)
LYMPHOCYTES # BLD AUTO: 1.9 K/UL — SIGNIFICANT CHANGE UP (ref 1–3.3)
LYMPHOCYTES # BLD AUTO: 30.9 % — SIGNIFICANT CHANGE UP (ref 13–44)
MAGNESIUM SERPL-MCNC: 2.1 MG/DL — SIGNIFICANT CHANGE UP (ref 1.6–2.6)
MCHC RBC-ENTMCNC: 28.2 PG — SIGNIFICANT CHANGE UP (ref 27–34)
MCHC RBC-ENTMCNC: 34.6 GM/DL — SIGNIFICANT CHANGE UP (ref 32–36)
MCV RBC AUTO: 81.4 FL — SIGNIFICANT CHANGE UP (ref 80–100)
MONOCYTES # BLD AUTO: 0.41 K/UL — SIGNIFICANT CHANGE UP (ref 0–0.9)
MONOCYTES NFR BLD AUTO: 6.7 % — SIGNIFICANT CHANGE UP (ref 2–14)
NEUTROPHILS # BLD AUTO: 3.78 K/UL — SIGNIFICANT CHANGE UP (ref 1.8–7.4)
NEUTROPHILS NFR BLD AUTO: 61.5 % — SIGNIFICANT CHANGE UP (ref 43–77)
NRBC # BLD: 0 /100 WBCS — SIGNIFICANT CHANGE UP
NRBC # FLD: 0 K/UL — SIGNIFICANT CHANGE UP
PHOSPHATE SERPL-MCNC: 2.2 MG/DL — LOW (ref 2.5–4.5)
PLATELET # BLD AUTO: 131 K/UL — LOW (ref 150–400)
POTASSIUM SERPL-MCNC: 2.5 MMOL/L — CRITICAL LOW (ref 3.5–5.3)
POTASSIUM SERPL-MCNC: 2.9 MMOL/L — CRITICAL LOW (ref 3.5–5.3)
POTASSIUM SERPL-SCNC: 2.5 MMOL/L — CRITICAL LOW (ref 3.5–5.3)
POTASSIUM SERPL-SCNC: 2.9 MMOL/L — CRITICAL LOW (ref 3.5–5.3)
PROT SERPL-MCNC: 6.3 G/DL — SIGNIFICANT CHANGE UP (ref 6–8.3)
RBC # BLD: 5 M/UL — SIGNIFICANT CHANGE UP (ref 4.2–5.8)
RBC # FLD: 13 % — SIGNIFICANT CHANGE UP (ref 10.3–14.5)
SODIUM SERPL-SCNC: 137 MMOL/L — SIGNIFICANT CHANGE UP (ref 135–145)
SODIUM SERPL-SCNC: 140 MMOL/L — SIGNIFICANT CHANGE UP (ref 135–145)
WBC # BLD: 6.15 K/UL — SIGNIFICANT CHANGE UP (ref 3.8–10.5)
WBC # FLD AUTO: 6.15 K/UL — SIGNIFICANT CHANGE UP (ref 3.8–10.5)

## 2021-02-14 PROCEDURE — 99232 SBSQ HOSP IP/OBS MODERATE 35: CPT

## 2021-02-14 PROCEDURE — 99233 SBSQ HOSP IP/OBS HIGH 50: CPT | Mod: GC

## 2021-02-14 RX ORDER — POTASSIUM CHLORIDE 20 MEQ
10 PACKET (EA) ORAL
Refills: 0 | Status: COMPLETED | OUTPATIENT
Start: 2021-02-14 | End: 2021-02-14

## 2021-02-14 RX ORDER — INSULIN GLARGINE 100 [IU]/ML
20 INJECTION, SOLUTION SUBCUTANEOUS AT BEDTIME
Refills: 0 | Status: DISCONTINUED | OUTPATIENT
Start: 2021-02-14 | End: 2021-02-15

## 2021-02-14 RX ORDER — POTASSIUM CHLORIDE 20 MEQ
40 PACKET (EA) ORAL EVERY 4 HOURS
Refills: 0 | Status: COMPLETED | OUTPATIENT
Start: 2021-02-14 | End: 2021-02-14

## 2021-02-14 RX ORDER — POTASSIUM CHLORIDE 20 MEQ
40 PACKET (EA) ORAL ONCE
Refills: 0 | Status: COMPLETED | OUTPATIENT
Start: 2021-02-14 | End: 2021-02-14

## 2021-02-14 RX ORDER — SODIUM,POTASSIUM PHOSPHATES 278-250MG
1 POWDER IN PACKET (EA) ORAL
Refills: 0 | Status: COMPLETED | OUTPATIENT
Start: 2021-02-14 | End: 2021-02-15

## 2021-02-14 RX ORDER — INSULIN LISPRO 100/ML
8 VIAL (ML) SUBCUTANEOUS
Refills: 0 | Status: DISCONTINUED | OUTPATIENT
Start: 2021-02-14 | End: 2021-02-15

## 2021-02-14 RX ADMIN — Medication 1: at 08:39

## 2021-02-14 RX ADMIN — INSULIN GLARGINE 20 UNIT(S): 100 INJECTION, SOLUTION SUBCUTANEOUS at 21:23

## 2021-02-14 RX ADMIN — Medication 1 PACKET(S): at 23:26

## 2021-02-14 RX ADMIN — Medication 40 MILLIEQUIVALENT(S): at 14:43

## 2021-02-14 RX ADMIN — Medication 100 MILLIEQUIVALENT(S): at 14:43

## 2021-02-14 RX ADMIN — Medication 100 MILLIEQUIVALENT(S): at 16:01

## 2021-02-14 RX ADMIN — Medication 40 MILLIEQUIVALENT(S): at 10:01

## 2021-02-14 RX ADMIN — Medication 100 MILLIEQUIVALENT(S): at 12:23

## 2021-02-14 RX ADMIN — Medication 40 MILLIEQUIVALENT(S): at 17:16

## 2021-02-14 RX ADMIN — Medication 1 PACKET(S): at 17:16

## 2021-02-14 RX ADMIN — Medication 3: at 12:22

## 2021-02-14 RX ADMIN — Medication 100 MILLIEQUIVALENT(S): at 10:33

## 2021-02-14 RX ADMIN — Medication 6 UNIT(S): at 08:38

## 2021-02-14 RX ADMIN — Medication 100 MILLIEQUIVALENT(S): at 18:02

## 2021-02-14 RX ADMIN — Medication 1 PACKET(S): at 12:23

## 2021-02-14 RX ADMIN — Medication 2: at 17:20

## 2021-02-14 RX ADMIN — Medication 8 UNIT(S): at 12:23

## 2021-02-14 RX ADMIN — Medication 100 MILLIEQUIVALENT(S): at 19:38

## 2021-02-14 RX ADMIN — Medication 8 UNIT(S): at 17:20

## 2021-02-14 NOTE — DIETITIAN INITIAL EVALUATION ADULT. - PHYSCIAL ASSESSMENT
Normal BMI Classification.  Pt with no overt signs of muscle wasting/fat loss upon visualization.  No pressure injuries noted at this time.

## 2021-02-14 NOTE — DISCHARGE NOTE PROVIDER - NSFOLLOWUPCLINICS_GEN_ALL_ED_FT
[facility]  Adolescent Medicine  [address] [place] [address]  Phone: [phone]  Fax:   Follow Up Time: 1-3 days

## 2021-02-14 NOTE — DISCHARGE NOTE PROVIDER - NSDCFUADDINST_GEN_ALL_CORE_FT
Please have your basic metabolic panel checked for your potassium level with carol primary care doctor.

## 2021-02-14 NOTE — DIETITIAN INITIAL EVALUATION ADULT. - ENERGY INTAKE
Pt with varying PO intake in house, dislikes institutional food items. Offered to provide nutrition supplement however pt declining at this time. Emphasized importance of adequate PO intake, encouraged completion of daily menu with food preferences.
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
## 2021-02-14 NOTE — DISCHARGE NOTE PROVIDER - CARE PROVIDERS DIRECT ADDRESSES
[email or web address]

## 2021-02-14 NOTE — DISCHARGE NOTE PROVIDER - PROVIDER TOKENS
PROVIDER:[TOKEN:[[id number]],SCHEDULEDAPPT:[02/18/2021],SCHEDULEDAPPTTIME:[04:00 PM]],PROVIDER:[TOKEN:[[id number]],FOLLOWUP:[2 weeks]]
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
## 2021-02-14 NOTE — DISCHARGE NOTE PROVIDER - NSDCFUSCHEDAPPT_GEN_ALL_CORE_FT
HARRIETT VALLECILLO ; 02/18/2021 ; NPP Med Int [address] [facility]  HARRIETT VALLECILLO ; 03/18/2021 ; NPP Med Int [address] [facility] HARRIETT VALLECILLO ; 03/12/2021 ; NPP Rad  [facility] [facility]  HARRIETT VALLECILLO ; 03/18/2021 ; NPP Med Int [address] [facility]

## 2021-02-14 NOTE — DISCHARGE NOTE PROVIDER - CARE PROVIDER_API CALL
Maryann Del Rosario)  Internal Medicine  [address] [facility] [address]  [place] [address]  Phone: [phone]  Fax: [phone]  Scheduled Appointment: 02/18/2021 04:00 PM    José Manuel Cyr)  EndocrinologyMetabDiabetes  [address] [place] [address]  Phone: [phone]  Fax: [phone]  Follow Up Time: 2 weeks

## 2021-02-14 NOTE — DIETITIAN INITIAL EVALUATION ADULT. - PERTINENT MEDS FT
famotidine Tablet, LANTUS 20 Unit(s) at bedtime, ADMELOG corrective regimen sliding scale, ADMELOG 8 Unit(s) three times a day before meals, potassium chloride Tablet, potassium chloride  10 mEq/100 mL IVPB, senna, calcium carbonate PRN, ondansetron Injectable PRN

## 2021-02-14 NOTE — DISCHARGE NOTE PROVIDER - NSDCMRMEDTOKEN_GEN_ALL_CORE_FT
Tabby-Snover oral tablet, effervescent:   Senokot 8.6 mg oral tablet: (unclear if taking this OTC med)   Tums 500 mg oral tablet, chewable: 1 tab(s) orally once a day for stomach acid   Admelog 100 units/mL injectable solution: 10 unit(s) subcutaneous 3 times a day (with meals)     test script  alcohol swabs : Apply topically to affected area 4 times a day   atorvastatin 40 mg oral tablet: 1 tab(s) orally once a day (at bedtime)  glucometer (per patient&#x27;s insurance): Test blood sugars four times a day. Dispense #1 glucometer.  Insulin Pen Needles, 4mm: 1 application subcutaneously 4 times a day. ** Use with insulin pen **   lancets: 1 application subcutaneously 4 times a day   lisinopril 5 mg oral tablet: 1 tab(s) orally once a day  Semglee Prefilled Pen 100 units/mL subcutaneous solution: 26 unit(s) subcutaneous once a day (at bedtime)    TEST SCRIPT: 52189   Senokot 8.6 mg oral tablet: (unclear if taking this OTC med)   test strips (per patient&#x27;s insurance): 1 application subcutaneously 4 times a day. ** Compatible with patient&#x27;s glucometer **  Tums 500 mg oral tablet, chewable: 1 tab(s) orally once a day for stomach acid

## 2021-02-14 NOTE — DIETITIAN INITIAL EVALUATION ADULT. - OTHER INFO
Pt with newly diagnosed diabetes, HbA1c 12.7%, with likely discharge on basal/bolus insulin per endo. Insulin increased in setting of hyperglycemia, currently ordered for Lantus 20U/Admelog 8U TID/ISS. Pt communicates feeling overwhelmed with diagnosis, amenable to brief verbal nutrition education, written materials left at bedside and pt made aware RDN remains available to review. Topics discussed include: label reading, sources of carbohydrates, hypoglycemia prevention/management and timing of meals/snacks. Encouraged outpatient endocrinology follow up.     Pt reports usual body weight 175 lbs two weeks ago, dosing weight 147 lbs (2/12), no history available via chart however significant weight loss in short time frame unlikely as pt eating well separate from ~1 week PTA, and with no apparent muscle wasting/fat loss upon visualization. Noted pt with low K+/P, repleted. Pt denies current GI distress (nausea/vomiting/constipation/diarrhea), last BM 2/12 per flowsheets.

## 2021-02-14 NOTE — DIETITIAN INITIAL EVALUATION ADULT. - ADD RECOMMEND
2) Pt provided with written and verbal type 2 diabetes nutrition education, made aware RDN remains available PRN. 3) Monitor PO intake, weight trends, nutrition related lab values, BMs/GI distress, hydration status, skin integrity.

## 2021-02-14 NOTE — PROGRESS NOTE ADULT - PROBLEM SELECTOR PLAN 1
At presentation, pt with blood sugar of 695, BHB 10.1, pH 7.0, bicarb 7, HbA1c 12.7 AG now closed and FSBS in 200s  --Lantus qhs 18 Units and Lispro 6 units premeal  --f/u endo recs

## 2021-02-14 NOTE — DISCHARGE NOTE PROVIDER - NSDCFUADDAPPT_GEN_ALL_CORE_FT
Please follow up with Endocrinology 1-2 weeks after discharge. Please follow up with Endocrinology 1-2 weeks after discharge.    You have a tele-medicine appointment on Thursday 2/18 at 4pm with your new primary care physicians at [address] [place] [address]. You will also have a physical appointment on Thursday 3/18 at 3pm at the same clinic.

## 2021-02-14 NOTE — DIETITIAN INITIAL EVALUATION ADULT. - PERTINENT LABORATORY DATA
(2/14) Na 137, BUN 9, Cr 0.79, <H>, K+ 2.5<LL>, Phos 2.2<L>, Mg 2.1, Alk Phos 71, ALT/SGPT 43<H>, AST/SGOT 53<H>. (2/12) HbA1c 12.7%. POCT: (2/14) 197, [phone].

## 2021-02-14 NOTE — DISCHARGE NOTE PROVIDER - NSDCCPCAREPLAN_GEN_ALL_CORE_FT
PRINCIPAL DISCHARGE DIAGNOSIS  Diagnosis: DKA (diabetic ketoacidoses)  Assessment and Plan of Treatment: You came into the hospital because you had increased thirst, urination and abdominal pain. You were found to have elevated sugar levels and diagnosed with diabetes. You also had elevated ketone acid levels, known as a condition called diabetic ketoacidosis. You were started on fluids and insulin, which is a medication to decrease your sugar levels. Your symptoms improved. Please start taking insulin as directed. Please follow up with Endocrinology 1-2 weeks after discharge. Please come back to the emergency room if you have dizziness, lightheadedness, increased thirst or urination.        SECONDARY DISCHARGE DIAGNOSES  Diagnosis: Pancreatic cyst  Assessment and Plan of Treatment: You came into the hospital because you had abdominal pain. A CT scan was done of your abdomen which showed a small pancreatic cyst. Please follow up with your PCP 1-2 weeks after discharge.     PRINCIPAL DISCHARGE DIAGNOSIS  Diagnosis: DKA (diabetic ketoacidoses)  Assessment and Plan of Treatment: You came into the hospital because you had increased thirst, urination and abdominal pain. You were found to have elevated sugar levels and diagnosed with diabetes. You also had elevated ketone acid levels, known as a condition called diabetic ketoacidosis. You were started on fluids and insulin, which is a medication to decrease your sugar levels. Your symptoms improved. Please start taking insulin as directed. Please follow up with Endocrinology 1-2 weeks after discharge. Please come back to the emergency room if you have dizziness, lightheadedness, increased thirst or urination.        SECONDARY DISCHARGE DIAGNOSES  Diagnosis: Pancreatic cyst  Assessment and Plan of Treatment: You came into the hospital because you had abdominal pain. A CT scan was done of your abdomen which showed a small pancreatic cyst. Please follow up with your PCP 1-2 weeks after discharge. Please make sure to get an MRI outpatient after discussing with PCP to further monitor the pancreatic cyst.
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
## 2021-02-14 NOTE — DIETITIAN INITIAL EVALUATION ADULT. - ORAL INTAKE PTA/DIET HISTORY
Pt reports decreased PO intake x1 week PTA in setting of acute illness. Pt follows regular diet, typically consumes 2 meals/day, avoids sugar sweetened beverages. Pt confirms NKFA, no noted micronutrient supplementation PTA per H&P.

## 2021-02-14 NOTE — PROGRESS NOTE ADULT - SUBJECTIVE AND OBJECTIVE BOX
Ariel Carpio MD  PGY 1 Department of Internal Medicine  Pager: [phone] ([facility])   Pager: [phone] ([facility])  Also available on Microsoft Teams      Patient is a 50y old  Male who presents with a chief complaint of abdominal pain, n/v (13 Feb 2021 10:00)      SUBJECTIVE / OVERNIGHT EVENTS: No acute overnight events. Pt seen and examined. Denies fevers, chills, CP, SOB, Abdominal pain, N/V, Constipation, Diarrhea    MEDICATIONS  (STANDING):  dextrose 40% Gel 15 Gram(s) Oral once  dextrose 5%. 1000 milliLiter(s) (50 mL/Hr) IV Continuous <Continuous>  dextrose 5%. 1000 milliLiter(s) (100 mL/Hr) IV Continuous <Continuous>  dextrose 50% Injectable 25 Gram(s) IV Push once  dextrose 50% Injectable 12.5 Gram(s) IV Push once  dextrose 50% Injectable 25 Gram(s) IV Push once  famotidine    Tablet 20 milliGRAM(s) Oral daily  glucagon  Injectable 1 milliGRAM(s) IntraMuscular once  influenza   Vaccine 0.5 milliLiter(s) IntraMuscular once  insulin glargine Injectable (LANTUS) 18 Unit(s) SubCutaneous at bedtime  insulin lispro (ADMELOG) corrective regimen sliding scale   SubCutaneous three times a day before meals  insulin lispro (ADMELOG) corrective regimen sliding scale   SubCutaneous at bedtime  insulin lispro Injectable (ADMELOG) 6 Unit(s) SubCutaneous three times a day before meals  potassium chloride    Tablet ER 40 milliEquivalent(s) Oral every 4 hours  senna 2 Tablet(s) Oral at bedtime    MEDICATIONS  (PRN):  calcium carbonate    500 mG (Tums) Chewable 1 Tablet(s) Chew three times a day PRN Heartburn  ondansetron Injectable 4 milliGRAM(s) IV Push every 6 hours PRN Nausea and/or Vomiting      I&O's Summary      Vital Signs Last 24 Hrs  T(C): 37.2 (14 Feb 2021 04:37), Max: 37.2 (14 Feb 2021 04:37)  T(F): 98.9 (14 Feb 2021 04:37), Max: 98.9 (14 Feb 2021 04:37)  HR: 77 (14 Feb 2021 04:37) (77 - 84)  BP: 144/95 (14 Feb 2021 04:37) (140/95 - 144/95)  BP(mean): --  RR: 16 (14 Feb 2021 04:37) (16 - 16)  SpO2: 100% (14 Feb 2021 04:37) (100% - 100%)    =================PHYSICAL EXAM=================    GENERAL: Laying comfortably, NAD  EYES: EOMI, PERRL, no scleral icterus  NECK: No JVD  LUNG: Clear to auscultation bilaterally; No wheeze, crackles or rhonci  HEART: Regular rate and rhythm; No murmurs, rubs, or gallops  ABDOMEN: Soft, Nontender, Nondistended  EXTREMITIES:  No LE edema, 2+ Peripheral Pulses, No clubbing, cyanosis, or edema  PSYCH: AAOx3  NEUROLOGY: non-focal, strength 5/5 in all extremities, sensation intact  SKIN: No rashes or lesions    =================================================    LABS:                        14.1   6.15  )-----------( 131      ( 14 Feb 2021 08:01 )             40.7     Auto Eosinophil # 0.02  / Auto Eosinophil % 0.3   / Auto Neutrophil # 3.78  / Auto Neutrophil % 61.5  / BANDS % x                            12.1   7.99  )-----------( 130      ( 13 Feb 2021 01:37 )             35.7     Auto Eosinophil # x     / Auto Eosinophil % x     / Auto Neutrophil # x     / Auto Neutrophil % x     / BANDS % x        02-14    137  |  99  |  9   ----------------------------<  201<H>  2.5<LL>   |  21<L>  |  0.79  02-13    141  |  111<H>  |  16  ----------------------------<  252<H>  3.9   |  22  |  0.82  02-12    144  |  111<H>  |  20  ----------------------------<  218<H>  3.4<L>   |  21<L>  |  0.95    Ca    9.1      14 Feb 2021 08:01  Mg     2.1     02-14  Phos  2.2     02-14  TPro  6.3  /  Alb  3.7  /  TBili  0.8  /  DBili  x   /  AST  53<H>  /  ALT  43<H>  /  AlkPhos  71  02-14                  RADIOLOGY & ADDITIONAL TESTS:    Imaging Personally Reviewed:    Consultant(s) Notes Reviewed:      Care Discussed with Consultants/Other Providers:

## 2021-02-15 LAB
ANION GAP SERPL CALC-SCNC: 10 MMOL/L — SIGNIFICANT CHANGE UP (ref 7–14)
ANION GAP SERPL CALC-SCNC: 13 MMOL/L — SIGNIFICANT CHANGE UP (ref 7–14)
ANION GAP SERPL CALC-SCNC: 15 MMOL/L — HIGH (ref 7–14)
BUN SERPL-MCNC: 10 MG/DL — SIGNIFICANT CHANGE UP (ref 7–23)
BUN SERPL-MCNC: 12 MG/DL — SIGNIFICANT CHANGE UP (ref 7–23)
BUN SERPL-MCNC: 12 MG/DL — SIGNIFICANT CHANGE UP (ref 7–23)
CALCIUM SERPL-MCNC: 9 MG/DL — SIGNIFICANT CHANGE UP (ref 8.4–10.5)
CALCIUM SERPL-MCNC: 9.3 MG/DL — SIGNIFICANT CHANGE UP (ref 8.4–10.5)
CALCIUM SERPL-MCNC: 9.4 MG/DL — SIGNIFICANT CHANGE UP (ref 8.4–10.5)
CHLORIDE SERPL-SCNC: 101 MMOL/L — SIGNIFICANT CHANGE UP (ref 98–107)
CHLORIDE SERPL-SCNC: 102 MMOL/L — SIGNIFICANT CHANGE UP (ref 98–107)
CHLORIDE SERPL-SCNC: 103 MMOL/L — SIGNIFICANT CHANGE UP (ref 98–107)
CHOLEST SERPL-MCNC: 176 MG/DL — SIGNIFICANT CHANGE UP
CO2 SERPL-SCNC: 23 MMOL/L — SIGNIFICANT CHANGE UP (ref 22–31)
CO2 SERPL-SCNC: 24 MMOL/L — SIGNIFICANT CHANGE UP (ref 22–31)
CO2 SERPL-SCNC: 26 MMOL/L — SIGNIFICANT CHANGE UP (ref 22–31)
CREAT SERPL-MCNC: 0.71 MG/DL — SIGNIFICANT CHANGE UP (ref 0.5–1.3)
CREAT SERPL-MCNC: 0.82 MG/DL — SIGNIFICANT CHANGE UP (ref 0.5–1.3)
CREAT SERPL-MCNC: 0.88 MG/DL — SIGNIFICANT CHANGE UP (ref 0.5–1.3)
GLUCOSE SERPL-MCNC: 194 MG/DL — HIGH (ref 70–99)
GLUCOSE SERPL-MCNC: 247 MG/DL — HIGH (ref 70–99)
GLUCOSE SERPL-MCNC: 249 MG/DL — HIGH (ref 70–99)
HCT VFR BLD CALC: 40.1 % — SIGNIFICANT CHANGE UP (ref 39–50)
HDLC SERPL-MCNC: 37 MG/DL — LOW
HGB BLD-MCNC: 14.1 G/DL — SIGNIFICANT CHANGE UP (ref 13–17)
LIPID PNL WITH DIRECT LDL SERPL: 109 MG/DL — HIGH
MAGNESIUM SERPL-MCNC: 2.1 MG/DL — SIGNIFICANT CHANGE UP (ref 1.6–2.6)
MCHC RBC-ENTMCNC: 27.8 PG — SIGNIFICANT CHANGE UP (ref 27–34)
MCHC RBC-ENTMCNC: 35.2 GM/DL — SIGNIFICANT CHANGE UP (ref 32–36)
MCV RBC AUTO: 79.1 FL — LOW (ref 80–100)
NON HDL CHOLESTEROL: 139 MG/DL — HIGH
NRBC # BLD: 0 /100 WBCS — SIGNIFICANT CHANGE UP
NRBC # FLD: 0 K/UL — SIGNIFICANT CHANGE UP
PHOSPHATE SERPL-MCNC: 2.3 MG/DL — LOW (ref 2.5–4.5)
PLATELET # BLD AUTO: 126 K/UL — LOW (ref 150–400)
POTASSIUM SERPL-MCNC: 3.3 MMOL/L — LOW (ref 3.5–5.3)
POTASSIUM SERPL-MCNC: 3.4 MMOL/L — LOW (ref 3.5–5.3)
POTASSIUM SERPL-MCNC: 3.7 MMOL/L — SIGNIFICANT CHANGE UP (ref 3.5–5.3)
POTASSIUM SERPL-SCNC: 3.3 MMOL/L — LOW (ref 3.5–5.3)
POTASSIUM SERPL-SCNC: 3.4 MMOL/L — LOW (ref 3.5–5.3)
POTASSIUM SERPL-SCNC: 3.7 MMOL/L — SIGNIFICANT CHANGE UP (ref 3.5–5.3)
RBC # BLD: 5.07 M/UL — SIGNIFICANT CHANGE UP (ref 4.2–5.8)
RBC # FLD: 12.7 % — SIGNIFICANT CHANGE UP (ref 10.3–14.5)
SODIUM SERPL-SCNC: 139 MMOL/L — SIGNIFICANT CHANGE UP (ref 135–145)
TRIGL SERPL-MCNC: 152 MG/DL — HIGH
WBC # BLD: 5.78 K/UL — SIGNIFICANT CHANGE UP (ref 3.8–10.5)
WBC # FLD AUTO: 5.78 K/UL — SIGNIFICANT CHANGE UP (ref 3.8–10.5)

## 2021-02-15 PROCEDURE — 99232 SBSQ HOSP IP/OBS MODERATE 35: CPT | Mod: GC

## 2021-02-15 PROCEDURE — 99232 SBSQ HOSP IP/OBS MODERATE 35: CPT

## 2021-02-15 RX ORDER — POTASSIUM CHLORIDE 20 MEQ
10 PACKET (EA) ORAL
Refills: 0 | Status: COMPLETED | OUTPATIENT
Start: 2021-02-15 | End: 2021-02-15

## 2021-02-15 RX ORDER — INSULIN LISPRO 100/ML
10 VIAL (ML) SUBCUTANEOUS ONCE
Refills: 0 | Status: COMPLETED | OUTPATIENT
Start: 2021-02-15 | End: 2021-02-15

## 2021-02-15 RX ORDER — POTASSIUM CHLORIDE 20 MEQ
40 PACKET (EA) ORAL ONCE
Refills: 0 | Status: COMPLETED | OUTPATIENT
Start: 2021-02-15 | End: 2021-02-15

## 2021-02-15 RX ORDER — ATORVASTATIN CALCIUM 80 MG/1
40 TABLET, FILM COATED ORAL AT BEDTIME
Refills: 0 | Status: DISCONTINUED | OUTPATIENT
Start: 2021-02-15 | End: 2021-02-16

## 2021-02-15 RX ORDER — SODIUM,POTASSIUM PHOSPHATES 278-250MG
2 POWDER IN PACKET (EA) ORAL ONCE
Refills: 0 | Status: COMPLETED | OUTPATIENT
Start: 2021-02-15 | End: 2021-02-15

## 2021-02-15 RX ORDER — ACETAMINOPHEN 500 MG
650 TABLET ORAL EVERY 6 HOURS
Refills: 0 | Status: DISCONTINUED | OUTPATIENT
Start: 2021-02-15 | End: 2021-02-16

## 2021-02-15 RX ORDER — INSULIN GLARGINE 100 [IU]/ML
22 INJECTION, SOLUTION SUBCUTANEOUS AT BEDTIME
Refills: 0 | Status: DISCONTINUED | OUTPATIENT
Start: 2021-02-15 | End: 2021-02-16

## 2021-02-15 RX ORDER — INSULIN LISPRO 100/ML
10 VIAL (ML) SUBCUTANEOUS
Refills: 0 | Status: DISCONTINUED | OUTPATIENT
Start: 2021-02-15 | End: 2021-02-16

## 2021-02-15 RX ADMIN — Medication 100 MILLIEQUIVALENT(S): at 13:10

## 2021-02-15 RX ADMIN — Medication 2: at 13:09

## 2021-02-15 RX ADMIN — Medication 40 MILLIEQUIVALENT(S): at 23:31

## 2021-02-15 RX ADMIN — Medication 8 UNIT(S): at 08:51

## 2021-02-15 RX ADMIN — FAMOTIDINE 20 MILLIGRAM(S): 10 INJECTION INTRAVENOUS at 11:09

## 2021-02-15 RX ADMIN — Medication 2 PACKET(S): at 11:09

## 2021-02-15 RX ADMIN — INSULIN GLARGINE 22 UNIT(S): 100 INJECTION, SOLUTION SUBCUTANEOUS at 20:34

## 2021-02-15 RX ADMIN — ATORVASTATIN CALCIUM 40 MILLIGRAM(S): 80 TABLET, FILM COATED ORAL at 20:34

## 2021-02-15 RX ADMIN — Medication 10 UNIT(S): at 17:34

## 2021-02-15 RX ADMIN — Medication 10 UNIT(S): at 13:09

## 2021-02-15 RX ADMIN — Medication 100 MILLIEQUIVALENT(S): at 11:09

## 2021-02-15 RX ADMIN — Medication 3: at 17:34

## 2021-02-15 RX ADMIN — Medication 100 MILLIEQUIVALENT(S): at 08:51

## 2021-02-15 RX ADMIN — Medication 2: at 08:52

## 2021-02-15 RX ADMIN — Medication 40 MILLIEQUIVALENT(S): at 01:13

## 2021-02-15 RX ADMIN — Medication 1 PACKET(S): at 04:34

## 2021-02-15 RX ADMIN — Medication 40 MILLIEQUIVALENT(S): at 08:51

## 2021-02-15 NOTE — PROVIDER CONTACT NOTE (OTHER) - NAME OF MD/NP/PA/DO NOTIFIED:
Continued Stay Note  [facility]     Patient Name: Latanya Olsen  MRN: [id number]  Today's Date: 6/3/2019    Admit Date: 5/31/2019    Discharge Plan     Row Name 06/03/19 1316       Plan    Plan  [facility]     Patient/Family in Agreement with Plan  yes    Plan Comments  S/w Jonah Kaur of [facility] will accept when pt is ready.         Discharge Codes    No documentation.             Bee Reyes RN     MD Chan
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
## 2021-02-15 NOTE — PROGRESS NOTE ADULT - SUBJECTIVE AND OBJECTIVE BOX
Jose Alfredo Wheeler  PGY1/Medicine/[id number]/[phone]    HARRIETT VALLECILLO  50y  Male      Patient is a 50y old  Male who presents with a chief complaint of abdominal pain, n/v (14 Feb 2021 16:06)      INTERVAL HPI/OVERNIGHT EVENTS: No acute events overnight. Denies n/v/f/c/diarrhea/dysuria/cp/sob. Leg cramping improved. Per nurse pt refuses to inject himself with insulin.     Vital Signs Last 24 Hrs  T(C): 36.6 (14 Feb 2021 21:22), Max: 36.6 (14 Feb 2021 13:51)  T(F): 97.8 (14 Feb 2021 21:22), Max: 97.8 (14 Feb 2021 13:51)  HR: 85 (14 Feb 2021 21:22) (74 - 85)  BP: 145/94 (14 Feb 2021 21:22) (132/88 - 145/94)  BP(mean): --  RR: 18 (14 Feb 2021 21:22) (17 - 18)  SpO2: 100% (14 Feb 2021 21:22) (99% - 100%)    PHYSICAL EXAM:  GENERAL: Laying comfortably, NAD  EYES: EOMI, PERRL, no scleral icterus  NECK: No JVD  LUNG: Clear to auscultation bilaterally; No wheeze, crackles or rhonci  HEART: Regular rate and rhythm; No murmurs, rubs, or gallops  ABDOMEN: Soft, Nontender, Nondistended  EXTREMITIES:  No LE edema, 2+ Peripheral Pulses, No clubbing, cyanosis, or edema  PSYCH: AAOx3  NEUROLOGY: non-focal, strength 5/5 in all extremities, sensation intact  SKIN: No rashes or lesions    Consultant(s) Notes Reviewed:  [x ] YES  [ ] NO  Care Discussed with Consultants/Other Providers [ x] YES  [ ] NO    LABS:                        14.1   5.78  )-----------( 126      ( 15 Feb 2021 07:19 )             40.1     02-15    139  |  101  |  10  ----------------------------<  194<H>  3.3<L>   |  23  |  0.71    Ca    9.4      15 Feb 2021 07:19  Phos  2.3     02-15  Mg     2.1     02-15    TPro  6.3  /  Alb  3.7  /  TBili  0.8  /  DBili  x   /  AST  53<H>  /  ALT  43<H>  /  AlkPhos  71  02-14          CAPILLARY BLOOD GLUCOSE      POCT Blood Glucose.: 207 mg/dL (15 Feb 2021 08:32)  POCT Blood Glucose.: 243 mg/dL (14 Feb 2021 21:21)  POCT Blood Glucose.: 219 mg/dL (14 Feb 2021 17:16)  POCT Blood Glucose.: 268 mg/dL (14 Feb 2021 12:04)      RADIOLOGY & ADDITIONAL TESTS:    Imaging Personally Reviewed:  [ ] YES  [ ] NO

## 2021-02-15 NOTE — PROVIDER CONTACT NOTE (OTHER) - ACTION/TREATMENT ORDERED:
MD made aware. DM education reinforced. DM educator to see patient prior to discharge. Will continue to monitor.

## 2021-02-15 NOTE — PROVIDER CONTACT NOTE (OTHER) - SITUATION
Patient has difficulty understanding DM education; patient refuses to self inject due to phobia of needles
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
## 2021-02-15 NOTE — PROGRESS NOTE ADULT - PROBLEM SELECTOR PLAN 1
At presentation, pt with blood sugar of 695, BHB 10.1, pH 7.0, bicarb 7, HbA1c 12.7 AG now closed and FSBS in 200s  --Lantus qhs 20 Units and Lispro 8 units premeal  --f/u endo recs

## 2021-02-16 ENCOUNTER — TRANSCRIPTION ENCOUNTER (OUTPATIENT)
Age: 51
End: 2021-02-16

## 2021-02-16 VITALS
HEART RATE: 88 BPM | DIASTOLIC BLOOD PRESSURE: 99 MMHG | OXYGEN SATURATION: 100 % | SYSTOLIC BLOOD PRESSURE: 133 MMHG | RESPIRATION RATE: 18 BRPM | TEMPERATURE: 98 F
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
## 2021-02-16 LAB
ANION GAP SERPL CALC-SCNC: 10 MMOL/L — SIGNIFICANT CHANGE UP (ref 7–14)
BUN SERPL-MCNC: 14 MG/DL — SIGNIFICANT CHANGE UP (ref 7–23)
CALCIUM SERPL-MCNC: 9.3 MG/DL — SIGNIFICANT CHANGE UP (ref 8.4–10.5)
CHLORIDE SERPL-SCNC: 101 MMOL/L — SIGNIFICANT CHANGE UP (ref 98–107)
CO2 SERPL-SCNC: 28 MMOL/L — SIGNIFICANT CHANGE UP (ref 22–31)
CREAT SERPL-MCNC: 0.81 MG/DL — SIGNIFICANT CHANGE UP (ref 0.5–1.3)
GLUCOSE BLDC GLUCOMTR-MCNC: 304 MG/DL — HIGH (ref 70–99)
GLUCOSE SERPL-MCNC: 241 MG/DL — HIGH (ref 70–99)
HCT VFR BLD CALC: 42.7 % — SIGNIFICANT CHANGE UP (ref 39–50)
HGB BLD-MCNC: 14.2 G/DL — SIGNIFICANT CHANGE UP (ref 13–17)
MAGNESIUM SERPL-MCNC: 2.1 MG/DL — SIGNIFICANT CHANGE UP (ref 1.6–2.6)
MCHC RBC-ENTMCNC: 27.6 PG — SIGNIFICANT CHANGE UP (ref 27–34)
MCHC RBC-ENTMCNC: 33.3 GM/DL — SIGNIFICANT CHANGE UP (ref 32–36)
MCV RBC AUTO: 82.9 FL — SIGNIFICANT CHANGE UP (ref 80–100)
NRBC # BLD: 0 /100 WBCS — SIGNIFICANT CHANGE UP
NRBC # FLD: 0 K/UL — SIGNIFICANT CHANGE UP
PHOSPHATE SERPL-MCNC: 2.4 MG/DL — LOW (ref 2.5–4.5)
PLATELET # BLD AUTO: 128 K/UL — LOW (ref 150–400)
POTASSIUM SERPL-MCNC: 3.9 MMOL/L — SIGNIFICANT CHANGE UP (ref 3.5–5.3)
POTASSIUM SERPL-SCNC: 3.9 MMOL/L — SIGNIFICANT CHANGE UP (ref 3.5–5.3)
RBC # BLD: 5.15 M/UL — SIGNIFICANT CHANGE UP (ref 4.2–5.8)
RBC # FLD: 12.4 % — SIGNIFICANT CHANGE UP (ref 10.3–14.5)
SODIUM SERPL-SCNC: 139 MMOL/L — SIGNIFICANT CHANGE UP (ref 135–145)
WBC # BLD: 5.75 K/UL — SIGNIFICANT CHANGE UP (ref 3.8–10.5)
WBC # FLD AUTO: 5.75 K/UL — SIGNIFICANT CHANGE UP (ref 3.8–10.5)

## 2021-02-16 PROCEDURE — 99239 HOSP IP/OBS DSCHRG MGMT >30: CPT | Mod: GC

## 2021-02-16 PROCEDURE — 99232 SBSQ HOSP IP/OBS MODERATE 35: CPT

## 2021-02-16 RX ORDER — ATORVASTATIN CALCIUM 80 MG/1
1 TABLET, FILM COATED ORAL
Qty: 30 | Refills: 1
Start: 2021-02-16 | End: 2021-04-16

## 2021-02-16 RX ORDER — INSULIN GLARGINE 100 [IU]/ML
26 INJECTION, SOLUTION SUBCUTANEOUS
Qty: 1 | Refills: 0
Start: 2021-02-16 | End: 2021-03-17

## 2021-02-16 RX ORDER — LISINOPRIL 2.5 MG/1
5 TABLET ORAL DAILY
Refills: 0 | Status: DISCONTINUED | OUTPATIENT
Start: 2021-02-16 | End: 2021-02-16

## 2021-02-16 RX ORDER — LISINOPRIL 2.5 MG/1
1 TABLET ORAL
Qty: 30 | Refills: 1
Start: 2021-02-16 | End: 2021-04-16

## 2021-02-16 RX ORDER — ASPIRIN/SOD BICARB/CITRIC ACID 324 MG
0 TABLET, EFFERVESCENT ORAL
Qty: 0 | Refills: 0 | DISCHARGE

## 2021-02-16 RX ORDER — ENOXAPARIN SODIUM 100 MG/ML
26 INJECTION SUBCUTANEOUS
Qty: 1 | Refills: 0
Start: 2021-02-16 | End: 2021-03-17

## 2021-02-16 RX ORDER — INSULIN GLARGINE 100 [IU]/ML
26 INJECTION, SOLUTION SUBCUTANEOUS AT BEDTIME
Refills: 0 | Status: DISCONTINUED | OUTPATIENT
Start: 2021-02-16 | End: 2021-02-16

## 2021-02-16 RX ORDER — INSULIN LISPRO 100/ML
10 VIAL (ML) SUBCUTANEOUS
Qty: 1 | Refills: 0
Start: 2021-02-16 | End: 2021-03-17

## 2021-02-16 RX ORDER — ISOPROPYL ALCOHOL, BENZOCAINE .7; .06 ML/ML; ML/ML
1 SWAB TOPICAL
Qty: 100 | Refills: 1
Start: 2021-02-16 | End: 2021-04-06

## 2021-02-16 RX ADMIN — Medication 10 UNIT(S): at 08:52

## 2021-02-16 RX ADMIN — FAMOTIDINE 20 MILLIGRAM(S): 10 INJECTION INTRAVENOUS at 12:27

## 2021-02-16 RX ADMIN — Medication 10 UNIT(S): at 12:26

## 2021-02-16 RX ADMIN — Medication 4: at 12:26

## 2021-02-16 RX ADMIN — LISINOPRIL 5 MILLIGRAM(S): 2.5 TABLET ORAL at 12:28

## 2021-02-16 RX ADMIN — Medication 2: at 08:52

## 2021-02-16 NOTE — PROGRESS NOTE ADULT - SUBJECTIVE AND OBJECTIVE BOX
Chief Complaint: Newly diagnosed DM with hyperglycemia     History: Patient seen at bedside before lunch. Patient reports he is eating meals, denies n/v, denies s/s of hypoglycemia   Per primary team, plan for discharge today  Patient reports he is "getting used" to insulin injection - has needle phobia - but was able to practice insulin PEN with inpatient diabetes educator this AM, also been practicing self-injection before meals with RNs  Patient able to teach-back about use of basal/bolus insulin pen plan, BG targets and hypoglycemia recognition and treatment  Patient encouraged to schedule outpatient followup with endocrine    MEDICATIONS  (STANDING):  atorvastatin 40 milliGRAM(s) Oral at bedtime  dextrose 40% Gel 15 Gram(s) Oral once  dextrose 5%. 1000 milliLiter(s) (50 mL/Hr) IV Continuous <Continuous>  dextrose 5%. 1000 milliLiter(s) (100 mL/Hr) IV Continuous <Continuous>  dextrose 50% Injectable 25 Gram(s) IV Push once  dextrose 50% Injectable 12.5 Gram(s) IV Push once  dextrose 50% Injectable 25 Gram(s) IV Push once  famotidine    Tablet 20 milliGRAM(s) Oral daily  glucagon  Injectable 1 milliGRAM(s) IntraMuscular once  influenza   Vaccine 0.5 milliLiter(s) IntraMuscular once  insulin glargine Injectable (LANTUS) 26 Unit(s) SubCutaneous at bedtime  insulin lispro (ADMELOG) corrective regimen sliding scale   SubCutaneous three times a day before meals  insulin lispro (ADMELOG) corrective regimen sliding scale   SubCutaneous at bedtime  insulin lispro Injectable (ADMELOG) 10 Unit(s) SubCutaneous three times a day before meals  lisinopril 5 milliGRAM(s) Oral daily  senna 2 Tablet(s) Oral at bedtime    MEDICATIONS  (PRN):  acetaminophen   Tablet .. 650 milliGRAM(s) Oral every 6 hours PRN Temp greater or equal to 38C (100.4F), Mild Pain (1 - 3), Moderate Pain (4 - 6)  calcium carbonate    500 mG (Tums) Chewable 1 Tablet(s) Chew three times a day PRN Heartburn  ondansetron Injectable 4 milliGRAM(s) IV Push every 6 hours PRN Nausea and/or Vomiting    No Known Allergies    Review of Systems:  Cardiovascular: No chest pain  Respiratory: No SOB  GI: No nausea, vomiting  Endocrine: + polyuria, polydipsia      PHYSICAL EXAM:  VITALS: T(C): 36.4 (02-16-21 @ 12:35)  T(F): 97.6 (02-16-21 @ 12:35), Max: 97.8 (02-15-21 @ 20:21)  HR: 88 (02-16-21 @ 12:35) (79 - 88)  BP: 133/99 (02-16-21 @ 12:35) (133/99 - 139/71)  RR:  (18 - 18)  SpO2:  (100% - 100%)  Wt(kg): --  GENERAL: NAD  EYES: No proptosis, no lid lag, anicteric  HEENT:  Atraumatic, Normocephalic, moist mucous membranes  RESPIRATORY: unlabored respirations   PSYCH: Alert and oriented x 3, normal affect, normal mood    CAPILLARY BLOOD GLUCOSE    POCT Blood Glucose.: 304 mg/dL (16 Feb 2021 12:10)  POCT Blood Glucose.: 248 mg/dL (16 Feb 2021 08:45)  POCT Blood Glucose.: 227 mg/dL (15 Feb 2021 20:31)  POCT Blood Glucose.: 298 mg/dL (15 Feb 2021 17:08)      02-16    139  |  101  |  14  ----------------------------<  241<H>  3.9   |  28  |  0.81    EGFR if : 118  EGFR if non : 102    Ca    9.3      02-16  Mg     2.1     02-16  Phos  2.4     02-16    TPro  6.3  /  Alb  3.7  /  TBili  0.8  /  DBili  x   /  AST  53<H>  /  ALT  43<H>  /  AlkPhos  71  02-14      A1C with Estimated Average Glucose Result: 12.7 % (02-12-21 @ 06:20)    Diet, Consistent Carbohydrate/No Snacks (02-12-21 @ 21:35)

## 2021-02-16 NOTE — PROGRESS NOTE ADULT - PROBLEM SELECTOR PLAN 1
At presentation, pt with blood sugar of 695, BHB 10.1, pH 7.0, bicarb 7, HbA1c 12.7 AG now closed and FSBS in 200s  --Lantus qhs 22 Units and Lispro 10 units premeal  --f/u endo recs At presentation, pt with blood sugar of 695, BHB 10.1, pH 7.0, bicarb 7, HbA1c 12.7 AG now closed and FSBS in 200s  --Lantus qhs 22 Units and Lispro 10 units premeal --> plan to d/c on lantus qhs 26 and lispro 10 premeal  --f/u endo recs

## 2021-02-16 NOTE — PROGRESS NOTE ADULT - SUBJECTIVE AND OBJECTIVE BOX
Jose Alfredo Wheeler  PGY1/Medicine/[id number]/[phone]    HARRIETT VALLECILLO  50y  Male      Patient is a 50y old  Male who presents with a chief complaint of abdominal pain, n/v (15 Feb 2021 13:32)      INTERVAL HPI/OVERNIGHT EVENTS/ROS: No acute events overnight. Denies n/v/f/c/diarrhea/dysuria/cp/sob.     Vital Signs Last 24 Hrs  T(C): 36.6 (15 Feb 2021 20:21), Max: 36.6 (15 Feb 2021 12:35)  T(F): 97.8 (15 Feb 2021 20:21), Max: 97.9 (15 Feb 2021 12:35)  HR: 79 (15 Feb 2021 20:21) (79 - 84)  BP: 139/71 (15 Feb 2021 20:21) (139/71 - 144/101)  BP(mean): --  RR: 18 (15 Feb 2021 20:21) (17 - 18)  SpO2: 100% (15 Feb 2021 20:21) (99% - 100%)    PHYSICAL EXAM:  GENERAL: Laying comfortably, NAD  EYES: EOMI, PERRL, no scleral icterus  NECK: No JVD  LUNG: Clear to auscultation bilaterally; No wheeze, crackles or rhonchi  HEART: Regular rate and rhythm; No murmurs, rubs, or gallops  ABDOMEN: Soft, Nontender, Nondistended  EXTREMITIES:  No LE edema, 2+ Peripheral Pulses, No clubbing, cyanosis, or edema  PSYCH: AAOx3  NEUROLOGY: non-focal, strength 5/5 in all extremities, sensation intact  SKIN: No rashes or lesions    Consultant(s) Notes Reviewed:  [x ] YES  [ ] NO  Care Discussed with Consultants/Other Providers [ x] YES  [ ] NO    LABS:                        14.2   5.75  )-----------( 128      ( 16 Feb 2021 07:34 )             42.7     02-16    139  |  101  |  14  ----------------------------<  241<H>  3.9   |  28  |  0.81    Ca    9.3      16 Feb 2021 07:34  Phos  2.4     02-16  Mg     2.1     02-16            CAPILLARY BLOOD GLUCOSE      POCT Blood Glucose.: 248 mg/dL (16 Feb 2021 08:45)  POCT Blood Glucose.: 227 mg/dL (15 Feb 2021 20:31)  POCT Blood Glucose.: 298 mg/dL (15 Feb 2021 17:08)  POCT Blood Glucose.: 262 mg/dL (15 Feb 2021 15:23)  POCT Blood Glucose.: 230 mg/dL (15 Feb 2021 12:38)      RADIOLOGY & ADDITIONAL TESTS:    Imaging Personally Reviewed:  [ ] YES  [ ] NO

## 2021-02-16 NOTE — PROGRESS NOTE ADULT - PROBLEM SELECTOR PROBLEM 2
Type 2 diabetes mellitus with hyperglycemia, without long-term current use of insulin

## 2021-02-16 NOTE — PROGRESS NOTE ADULT - PROBLEM SELECTOR PLAN 3
Pancreatic tail cyst  - measuring 6mm on CT A/P w/ IV contrast  - will consider MRI Abd for further characterization vs outpatient f/u

## 2021-02-16 NOTE — PROGRESS NOTE ADULT - PROBLEM SELECTOR PROBLEM 3
Hypertension, unspecified type
Pancreatic cyst
Hypertension, unspecified type
Hypertension, unspecified type
Pancreatic cyst

## 2021-02-16 NOTE — DISCHARGE NOTE NURSING/CASE MANAGEMENT/SOCIAL WORK - NSDCFUADDAPPT_GEN_ALL_CORE_FT
Please follow up with Endocrinology 1-2 weeks after discharge.    You have a tele-medicine appointment on Thursday 2/18 at 4pm with your new primary care physicians at [address] [place] [address]. You will also have a physical appointment on Thursday 3/18 at 3pm at the same clinic.
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
## 2021-02-16 NOTE — PROGRESS NOTE ADULT - PROBLEM SELECTOR PROBLEM 4
Preventive measure
Hyperlipidemia, unspecified hyperlipidemia type
Preventive measure
Preventive measure
Hyperlipidemia, unspecified hyperlipidemia type
Hyperlipidemia, unspecified hyperlipidemia type
Preventive measure

## 2021-02-16 NOTE — PROGRESS NOTE ADULT - PROBLEM SELECTOR PROBLEM 1
Diabetic ketoacidosis without coma associated with type 2 diabetes mellitus

## 2021-02-16 NOTE — DISCHARGE NOTE NURSING/CASE MANAGEMENT/SOCIAL WORK - PATIENT PORTAL LINK FT
You can access the FollowMyHealth Patient Portal offered by [facility] by registering at the following website: [email or web address]. By joining [facility]’s FollowMyHealth portal, you will also be able to view your health information using other applications (apps) compatible with our system.

## 2021-02-16 NOTE — PROGRESS NOTE ADULT - PROBLEM SELECTOR PLAN 4
DVT: none given ambulatory  Diet: CC  Dispo: Home

## 2021-02-16 NOTE — PROGRESS NOTE ADULT - REASON FOR ADMISSION
abdominal pain, n/v
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
## 2021-02-16 NOTE — PROGRESS NOTE ADULT - PROVIDER SPECIALTY LIST ADULT
Endocrinology
Internal Medicine
MICU
Internal Medicine
Internal Medicine
Endocrinology
Endocrinology
Internal Medicine

## 2021-02-18 ENCOUNTER — APPOINTMENT (OUTPATIENT)
Dept: INTERNAL MEDICINE | Facility: CLINIC | Age: 51
End: 2021-02-18

## 2021-02-18 DIAGNOSIS — H53.8 OTHER VISUAL DISTURBANCES: ICD-10-CM

## 2021-02-18 DIAGNOSIS — Z78.9 OTHER SPECIFIED HEALTH STATUS: ICD-10-CM

## 2021-02-18 LAB
GAD65 AB SER-MCNC: 0 NMOL/L — SIGNIFICANT CHANGE UP
ISLET CELL512 AB SER-ACNC: SIGNIFICANT CHANGE UP

## 2021-02-18 RX ORDER — ROSUVASTATIN CALCIUM 20 MG/1
20 TABLET, FILM COATED ORAL DAILY
Qty: 30 | Refills: 2 | Status: ACTIVE | COMMUNITY
Start: 2021-02-18 | End: 1900-01-01

## 2021-02-19 PROBLEM — H53.8 BLURRY VISION: Status: ACTIVE | Noted: 2021-02-18

## 2021-02-21 LAB — ZINC TRANSPORTER 8 AB, RESULT: <15 U/ML — SIGNIFICANT CHANGE UP

## 2021-02-21 NOTE — REVIEW OF SYSTEMS
[Fever] : no fever [Discharge] : no discharge [Earache] : no earache [Chest Pain] : no chest pain [Abdominal Pain] : no abdominal pain [Shortness Of Breath] : no shortness of breath [Dysuria] : no dysuria [Joint Pain] : no joint pain [Itching] : no itching [Suicidal] : not suicidal [Easy Bleeding] : no easy bleeding
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
## 2021-02-21 NOTE — END OF VISIT
[FreeTextEntry3] : 50M newly diagnosed DM; workup still pending result (was in specimen received in [place]) for whether this is T2DM/T1DM or another dx. Patient is working very hard at lifestyle changes; adherent to insulin use. He is going to make appointments for endocrinology, ophthalmology, and for his MRI to eval pancreatic cyst. It would be a good idea for patient to meet with DM educator as well. Patient currently with a few different outstanding issues to follow up on so we can wait on this. Asked patient to please let us know if he has trouble booking endocrinology appt as he should follow up sooner rather than later. Otherwise he was well appearing and in no distress. He will reach out if any medication issues as well.

## 2021-02-21 NOTE — PLAN
[FreeTextEntry1] : #DM\par - a1c 12.7 well above goal of <7\par - increase lantus to 30 qHS and increase admelog to 12 TID to improve glycemic control\par - recommended lifestyle modifications to promote diabetes control; patient agrees to reduce carbohydrate intake avoiding macaroni, pasta, bread, potatoes, and high-starch and sugar foods\par - patient will continue to log FS\par - close f/u at in-person apt with Dr. Peterson 3/2021\par - f/u MATILDE, islet cell, zinc transported ABs pending in lab for further characterization of DM subtype but suspect RICHARD\par - c/w statin; switched from atorva 40 to rosuva 20 given side effects on atorva (dizziness, fatigue)\par - c/w lisinopril 5 qD patient is compliant and denies side effects\par - ophtho referral provided\par - will need pneumovax at next in-person visit\par - monitor a1c q3 months; check microalbumin at next visit\par \par #Blurry vision\par - ophtho referral provided\par \par #Pancreatic cyst\par - MRI abdomen with contrast ordered, will need auth\par \par #HCM\par - will need FLU and pneumovax at next visit. Discuss whether he has gotten TDAP booster previously\par - optho referral for DM\par - will need comprehensive foot exam at next visit\par - due for colonoscopy; must be addressed at in person visit\par \par RTC in March 2021 for in-person new patient CPE. Then q3 monthly for DM2 management
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
## 2021-03-02 ENCOUNTER — NON-APPOINTMENT (OUTPATIENT)
Age: 51
End: 2021-03-02

## 2021-03-18 ENCOUNTER — APPOINTMENT (OUTPATIENT)
Dept: INTERNAL MEDICINE | Facility: CLINIC | Age: 51
End: 2021-03-18
Payer: MEDICAID

## 2021-03-18 ENCOUNTER — LABORATORY RESULT (OUTPATIENT)
Age: 51
End: 2021-03-18

## 2021-03-18 ENCOUNTER — OUTPATIENT (OUTPATIENT)
Dept: OUTPATIENT SERVICES | Facility: HOSPITAL | Age: 51
LOS: 1 days | End: 2021-03-18
Payer: MEDICAID

## 2021-03-18 VITALS
DIASTOLIC BLOOD PRESSURE: 80 MMHG | BODY MASS INDEX: 23.34 KG/M2 | WEIGHT: 154 LBS | HEART RATE: 88 BPM | HEIGHT: 68 IN | SYSTOLIC BLOOD PRESSURE: 124 MMHG

## 2021-03-18 VITALS
BODY MASS INDEX: 23.34 KG/M2 | OXYGEN SATURATION: 84 % | HEIGHT: 68 IN | DIASTOLIC BLOOD PRESSURE: 80 MMHG | WEIGHT: 154 LBS | SYSTOLIC BLOOD PRESSURE: 124 MMHG | HEART RATE: 88 BPM

## 2021-03-18 DIAGNOSIS — Z00.00 ENCOUNTER FOR GENERAL ADULT MEDICAL EXAMINATION W/OUT ABNORMAL FINDINGS: ICD-10-CM

## 2021-03-18 DIAGNOSIS — S31.139A PUNCTURE WOUND OF ABDOMINAL WALL W/OUT FOREIGN BODY, UNSPECIFIED QUADRANT W/OUT PENETRATION INTO PERITONEAL CAVITY, INITIAL ENCOUNTER: ICD-10-CM

## 2021-03-18 DIAGNOSIS — I10 ESSENTIAL (PRIMARY) HYPERTENSION: ICD-10-CM

## 2021-03-18 DIAGNOSIS — E11.9 TYPE 2 DIABETES MELLITUS W/OUT COMPLICATIONS: ICD-10-CM

## 2021-03-18 DIAGNOSIS — K86.2 CYST OF PANCREAS: ICD-10-CM

## 2021-03-18 DIAGNOSIS — Z98.890 OTHER SPECIFIED POSTPROCEDURAL STATES: Chronic | ICD-10-CM
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
## 2021-03-18 PROCEDURE — 80053 COMPREHEN METABOLIC PANEL: CPT

## 2021-03-18 PROCEDURE — 80061 LIPID PANEL: CPT

## 2021-03-18 PROCEDURE — 85027 COMPLETE CBC AUTOMATED: CPT

## 2021-03-18 PROCEDURE — 83036 HEMOGLOBIN GLYCOSYLATED A1C: CPT

## 2021-03-18 PROCEDURE — G0463: CPT

## 2021-03-18 PROCEDURE — 99203 OFFICE O/P NEW LOW 30 MIN: CPT | Mod: GE

## 2021-03-18 RX ORDER — LANCETS 28 GAUGE
EACH MISCELLANEOUS
Qty: 1 | Refills: 2 | Status: ACTIVE | COMMUNITY
Start: 2021-03-18 | End: 1900-01-01

## 2021-03-18 RX ORDER — ISOPROPYL ALCOHOL 70 ML/100ML
SWAB TOPICAL
Qty: 180 | Refills: 3 | Status: ACTIVE | COMMUNITY
Start: 2021-03-18 | End: 1900-01-01

## 2021-03-18 RX ORDER — LISINOPRIL 2.5 MG/1
2.5 TABLET ORAL
Qty: 90 | Refills: 3 | Status: ACTIVE | COMMUNITY
Start: 2021-02-18 | End: 1900-01-01

## 2021-03-19 PROBLEM — K86.2 PANCREATIC CYST: Status: ACTIVE | Noted: 2021-02-18

## 2021-03-19 PROBLEM — S31.139A GUNSHOT WOUND, ABDOMINAL: Status: RESOLVED | Noted: 2021-02-18 | Resolved: 2021-03-19

## 2021-03-19 PROBLEM — E11.9 DIABETES MELLITUS: Status: ACTIVE | Noted: 2021-02-18

## 2021-03-19 PROBLEM — Z00.00 ENCOUNTER FOR PREVENTIVE HEALTH EXAMINATION: Status: ACTIVE | Noted: 2021-02-16

## 2021-03-19 LAB
A1C WITH ESTIMATED AVERAGE GLUCOSE RESULT: 10.6 % — HIGH (ref 4–5.6)
ALBUMIN SERPL ELPH-MCNC: 4.6 G/DL — SIGNIFICANT CHANGE UP (ref 3.3–5)
ALP SERPL-CCNC: 97 U/L — SIGNIFICANT CHANGE UP (ref 40–120)
ALT FLD-CCNC: 31 U/L — SIGNIFICANT CHANGE UP (ref 10–45)
ANION GAP SERPL CALC-SCNC: 13 MMOL/L — SIGNIFICANT CHANGE UP (ref 5–17)
AST SERPL-CCNC: 23 U/L — SIGNIFICANT CHANGE UP (ref 10–40)
BILIRUB SERPL-MCNC: 0.6 MG/DL — SIGNIFICANT CHANGE UP (ref 0.2–1.2)
BUN SERPL-MCNC: 11 MG/DL — SIGNIFICANT CHANGE UP (ref 7–23)
CALCIUM SERPL-MCNC: 9.6 MG/DL — SIGNIFICANT CHANGE UP (ref 8.4–10.5)
CHLORIDE SERPL-SCNC: 103 MMOL/L — SIGNIFICANT CHANGE UP (ref 96–108)
CHOLEST SERPL-MCNC: 153 MG/DL — SIGNIFICANT CHANGE UP
CO2 SERPL-SCNC: 28 MMOL/L — SIGNIFICANT CHANGE UP (ref 22–31)
CREAT SERPL-MCNC: 0.92 MG/DL — SIGNIFICANT CHANGE UP (ref 0.5–1.3)
ESTIMATED AVERAGE GLUCOSE: 258 MG/DL — HIGH (ref 68–114)
GLUCOSE SERPL-MCNC: 88 MG/DL — SIGNIFICANT CHANGE UP (ref 70–99)
HCT VFR BLD CALC: 43.4 % — SIGNIFICANT CHANGE UP (ref 39–50)
HDLC SERPL-MCNC: 44 MG/DL — SIGNIFICANT CHANGE UP
HGB BLD-MCNC: 13.6 G/DL — SIGNIFICANT CHANGE UP (ref 13–17)
LIPID PNL WITH DIRECT LDL SERPL: 75 MG/DL — SIGNIFICANT CHANGE UP
MCHC RBC-ENTMCNC: 28.2 PG — SIGNIFICANT CHANGE UP (ref 27–34)
MCHC RBC-ENTMCNC: 31.3 GM/DL — LOW (ref 32–36)
MCV RBC AUTO: 89.9 FL — SIGNIFICANT CHANGE UP (ref 80–100)
NON HDL CHOLESTEROL: 109 MG/DL — SIGNIFICANT CHANGE UP
PLATELET # BLD AUTO: 242 K/UL — SIGNIFICANT CHANGE UP (ref 150–400)
POTASSIUM SERPL-MCNC: 4.7 MMOL/L — SIGNIFICANT CHANGE UP (ref 3.5–5.3)
POTASSIUM SERPL-SCNC: 4.7 MMOL/L — SIGNIFICANT CHANGE UP (ref 3.5–5.3)
PROT SERPL-MCNC: 6.9 G/DL — SIGNIFICANT CHANGE UP (ref 6–8.3)
RBC # BLD: 4.83 M/UL — SIGNIFICANT CHANGE UP (ref 4.2–5.8)
RBC # FLD: 14 % — SIGNIFICANT CHANGE UP (ref 10.3–14.5)
SODIUM SERPL-SCNC: 144 MMOL/L — SIGNIFICANT CHANGE UP (ref 135–145)
TRIGL SERPL-MCNC: 171 MG/DL — HIGH
WBC # BLD: 8.03 K/UL — SIGNIFICANT CHANGE UP (ref 3.8–10.5)
WBC # FLD AUTO: 8.03 K/UL — SIGNIFICANT CHANGE UP (ref 3.8–10.5)

## 2021-03-19 RX ORDER — INSULIN LISPRO 100 U/ML
100 INJECTION, SOLUTION SUBCUTANEOUS
Qty: 5 | Refills: 2 | Status: ACTIVE | COMMUNITY
Start: 2021-02-18 | End: 1900-01-01

## 2021-03-19 RX ORDER — ELECTROLYTES/DEXTROSE
32G X 4 MM SOLUTION, ORAL ORAL
Qty: 1 | Refills: 0 | Status: ACTIVE | COMMUNITY
Start: 2021-03-19 | End: 1900-01-01

## 2021-03-19 RX ORDER — INSULIN GLARGINE 100 [IU]/ML
100 INJECTION, SOLUTION SUBCUTANEOUS
Qty: 10 | Refills: 1 | Status: ACTIVE | COMMUNITY
Start: 2021-02-18 | End: 1900-01-01

## 2021-03-19 NOTE — REVIEW OF SYSTEMS
[Vision Problems] : vision problems [Negative] : Psychiatric [Discharge] : no discharge [Pain] : no pain
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
## 2021-03-19 NOTE — PHYSICAL EXAM
[Normal] : affect was normal and insight and judgment were intact [Comprehensive Foot Exam Normal] : Right and left foot were examined and both feet are normal. No ulcers in either foot. Toes are normal and with full ROM.  Normal tactile sensation with monofilament testing throughout both feet [de-identified] : no visible lesions, full sensation in all areas of feet bilaterally with monofilament test
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
## 2021-03-19 NOTE — HEALTH RISK ASSESSMENT
[Good] : ~his/her~  mood as  good [0-5] : 0-5 [No] : No [Never (0 pts)] : Never (0 points) [] : No [de-identified] : yes  [de-identified] : vegetables, chiclen and fish

## 2021-03-22 DIAGNOSIS — E11.9 TYPE 2 DIABETES MELLITUS WITHOUT COMPLICATIONS: ICD-10-CM

## 2021-03-22 DIAGNOSIS — K86.2 CYST OF PANCREAS: ICD-10-CM

## 2021-03-22 DIAGNOSIS — S31.139A PUNCTURE WOUND OF ABDOMINAL WALL WITHOUT FOREIGN BODY, UNSPECIFIED QUADRANT WITHOUT PENETRATION INTO PERITONEAL CAVITY, INITIAL ENCOUNTER: ICD-10-CM

## 2021-04-07 ENCOUNTER — NON-APPOINTMENT (OUTPATIENT)
Age: 51
End: 2021-04-07

## 2021-04-09 ENCOUNTER — APPOINTMENT (OUTPATIENT)
Dept: MRI IMAGING | Facility: IMAGING CENTER | Age: 51
End: 2021-04-09

## 2021-04-24 NOTE — PROGRESS NOTE ADULT - PROBLEM/PLAN-1
DISPLAY PLAN FREE TEXT
Dr. Gomez
DISPLAY PLAN FREE TEXT
DISPLAY PLAN FREE TEXT

## 2021-05-27 ENCOUNTER — APPOINTMENT (OUTPATIENT)
Dept: INTERNAL MEDICINE | Facility: CLINIC | Age: 51
End: 2021-05-27

## 2021-05-27 ENCOUNTER — NON-APPOINTMENT (OUTPATIENT)
Age: 51
End: 2021-05-27

## 2021-06-15 ENCOUNTER — RX RENEWAL (OUTPATIENT)
Age: 51
End: 2021-06-15

## 2021-06-15 RX ORDER — INSULIN GLARGINE 100 [IU]/ML
100 INJECTION, SOLUTION SUBCUTANEOUS
Qty: 15 | Refills: 0 | Status: ACTIVE | COMMUNITY
Start: 2021-06-15 | End: 1900-01-01